# Patient Record
Sex: MALE | Race: WHITE | Employment: FULL TIME | URBAN - METROPOLITAN AREA
[De-identification: names, ages, dates, MRNs, and addresses within clinical notes are randomized per-mention and may not be internally consistent; named-entity substitution may affect disease eponyms.]

---

## 2017-11-14 PROBLEM — I10 ESSENTIAL HYPERTENSION: Status: ACTIVE | Noted: 2017-11-14

## 2017-11-14 PROBLEM — R53.83 FATIGUE: Status: ACTIVE | Noted: 2017-11-14

## 2017-11-14 PROBLEM — Z72.0 TOBACCO ABUSE: Status: ACTIVE | Noted: 2017-11-14

## 2017-11-14 PROBLEM — Z82.49 FAMILY HISTORY OF CHRONIC ISCHEMIC HEART DISEASE: Status: ACTIVE | Noted: 2017-11-14

## 2018-01-05 PROBLEM — D58.2 ELEVATED HEMOGLOBIN (HCC): Status: ACTIVE | Noted: 2018-01-05

## 2018-01-05 PROBLEM — E78.5 DYSLIPIDEMIA: Status: ACTIVE | Noted: 2018-01-05

## 2018-03-14 ENCOUNTER — TELEPHONE (OUTPATIENT)
Dept: FAMILY MEDICINE CLINIC | Age: 68
End: 2018-03-14

## 2018-03-16 ENCOUNTER — HOSPITAL ENCOUNTER (OUTPATIENT)
Age: 68
Discharge: HOME | End: 2018-03-18
Payer: COMMERCIAL

## 2018-03-16 DIAGNOSIS — Z72.0 TOBACCO ABUSE: ICD-10-CM

## 2018-03-16 DIAGNOSIS — E78.5 DYSLIPIDEMIA: ICD-10-CM

## 2018-03-16 DIAGNOSIS — I10 ESSENTIAL HYPERTENSION: ICD-10-CM

## 2018-03-16 DIAGNOSIS — D58.2 ELEVATED HEMOGLOBIN (HCC): ICD-10-CM

## 2018-03-16 LAB
ALBUMIN SERPL-MCNC: 4.5 G/DL (ref 3.5–5.2)
ALP BLD-CCNC: 90 U/L (ref 40–129)
ALT SERPL-CCNC: 29 U/L (ref 0–40)
ANION GAP SERPL CALCULATED.3IONS-SCNC: 15 MMOL/L (ref 7–16)
AST SERPL-CCNC: 21 U/L (ref 0–39)
BASOPHILS ABSOLUTE: 0.05 E9/L (ref 0–0.2)
BASOPHILS RELATIVE PERCENT: 0.4 % (ref 0–2)
BILIRUB SERPL-MCNC: 0.8 MG/DL (ref 0–1.2)
BUN BLDV-MCNC: 13 MG/DL (ref 8–23)
CALCIUM SERPL-MCNC: 9.6 MG/DL (ref 8.6–10.2)
CHLORIDE BLD-SCNC: 99 MMOL/L (ref 98–107)
CHOLESTEROL, TOTAL: 232 MG/DL (ref 0–199)
CO2: 27 MMOL/L (ref 22–29)
CREAT SERPL-MCNC: 0.9 MG/DL (ref 0.7–1.2)
EOSINOPHILS ABSOLUTE: 0.35 E9/L (ref 0.05–0.5)
EOSINOPHILS RELATIVE PERCENT: 3.1 % (ref 0–6)
GFR AFRICAN AMERICAN: >60
GFR NON-AFRICAN AMERICAN: >60 ML/MIN/1.73
GLUCOSE BLD-MCNC: 91 MG/DL (ref 74–109)
HCT VFR BLD CALC: 53.9 % (ref 37–54)
HDLC SERPL-MCNC: 33 MG/DL
HEMOGLOBIN: 17.5 G/DL (ref 12.5–16.5)
IMMATURE GRANULOCYTES #: 0.04 E9/L
IMMATURE GRANULOCYTES %: 0.4 % (ref 0–5)
LDL CHOLESTEROL CALCULATED: 165 MG/DL (ref 0–99)
LYMPHOCYTES ABSOLUTE: 3.47 E9/L (ref 1.5–4)
LYMPHOCYTES RELATIVE PERCENT: 31.1 % (ref 20–42)
MCH RBC QN AUTO: 28 PG (ref 26–35)
MCHC RBC AUTO-ENTMCNC: 32.5 % (ref 32–34.5)
MCV RBC AUTO: 86.1 FL (ref 80–99.9)
MONOCYTES ABSOLUTE: 0.97 E9/L (ref 0.1–0.95)
MONOCYTES RELATIVE PERCENT: 8.7 % (ref 2–12)
NEUTROPHILS ABSOLUTE: 6.29 E9/L (ref 1.8–7.3)
NEUTROPHILS RELATIVE PERCENT: 56.3 % (ref 43–80)
PDW BLD-RTO: 14 FL (ref 11.5–15)
PLATELET # BLD: 282 E9/L (ref 130–450)
PMV BLD AUTO: 11 FL (ref 7–12)
POTASSIUM SERPL-SCNC: 4.4 MMOL/L (ref 3.5–5)
RBC # BLD: 6.26 E12/L (ref 3.8–5.8)
SODIUM BLD-SCNC: 141 MMOL/L (ref 132–146)
TOTAL PROTEIN: 7.8 G/DL (ref 6.4–8.3)
TRIGL SERPL-MCNC: 169 MG/DL (ref 0–149)
VLDLC SERPL CALC-MCNC: 34 MG/DL
WBC # BLD: 11.2 E9/L (ref 4.5–11.5)

## 2018-03-16 PROCEDURE — 85025 COMPLETE CBC W/AUTO DIFF WBC: CPT

## 2018-03-16 PROCEDURE — 36415 COLL VENOUS BLD VENIPUNCTURE: CPT

## 2018-03-16 PROCEDURE — 80061 LIPID PANEL: CPT

## 2018-03-16 PROCEDURE — 80053 COMPREHEN METABOLIC PANEL: CPT

## 2018-03-21 ENCOUNTER — HOSPITAL ENCOUNTER (OUTPATIENT)
Dept: PSYCHIATRY | Age: 68
Setting detail: THERAPIES SERIES
Discharge: HOME | End: 2018-03-21

## 2018-03-28 ENCOUNTER — HOSPITAL ENCOUNTER (OUTPATIENT)
Dept: PSYCHIATRY | Age: 68
Setting detail: THERAPIES SERIES
Discharge: HOME | End: 2018-03-28
Payer: COMMERCIAL

## 2018-03-28 PROCEDURE — 90791 PSYCH DIAGNOSTIC EVALUATION: CPT

## 2018-03-28 ASSESSMENT — SLEEP AND FATIGUE QUESTIONNAIRES
DO YOU HAVE DIFFICULTY SLEEPING: YES
DIFFICULTY STAYING ASLEEP: YES
DIFFICULTY FALLING ASLEEP: YES
SLEEP PATTERN: DIFFICULTY FALLING ASLEEP;RESTLESSNESS;DISTURBED/INTERRUPTED SLEEP
RESTFUL SLEEP: NO
AVERAGE NUMBER OF SLEEP HOURS: 6
DO YOU USE A SLEEP AID: NO

## 2018-03-28 ASSESSMENT — LIFESTYLE VARIABLES: HISTORY_ALCOHOL_USE: YES

## 2018-03-28 ASSESSMENT — PATIENT HEALTH QUESTIONNAIRE - PHQ9: SUM OF ALL RESPONSES TO PHQ QUESTIONS 1-9: 12

## 2018-03-28 NOTE — CARE COORDINATION
SW met with pt to complete psychosocial assessment and CSSR-S. Pt was cooperative during time of assessment. Mood appeared anxious and euthymic at times, affect congruent. Pt was oriented x4. Thought processes were circumstantial and tangential. Pt denies hallucinations and delusions. Denies SI/HI. Pt currently lives at home by himself. Pt reported having good support system through ex-wife and one daughter. Pt is receiving short-term disability from his employer. Pt reported that he is employed with a company as an  but had to take time off due to increase in depression stemming from dental health concerns. Pt reported that he had dentures that were corrected by a dental company and ruined which has been a very big stressor for him causing a depressive episode. Pt stated that he cannot eat and cannot focus on anything else besides his dental issues. Pt reported that he has been experiencing depression and anxiety since approx 2003. 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 Pt reported that he has been sober for the past 18 years but would not consider his past alcohol use to be an issue. Pt indicated that he stopped drinking on his own and became increasingly depressed in 2003. In addition to dental concerns, pt reported worrying about having high blood pressure which may cause him to have a heart attack or pass out. Pt reported that he has been experiencing a depressed mood most of the day nearly every day, lack of interest or pleasure in doing things, experiencing hopelessness and helplessness, sleep disturbances, difficulty concentrating, and feeling fatigued. Pt stated that he has been experiencing depression on and off since 2003. Pt also indicated experiencing anxiety and panic attacks throughout his lifetime.  Pt stated that he experiences excessive worry, difficulty controlling worry, sleep disturbances, difficulty concentrating, and history of

## 2018-03-30 ENCOUNTER — HOSPITAL ENCOUNTER (OUTPATIENT)
Dept: PSYCHIATRY | Age: 68
Setting detail: THERAPIES SERIES
Discharge: HOME | End: 2018-03-30
Payer: COMMERCIAL

## 2018-03-30 VITALS
WEIGHT: 170 LBS | TEMPERATURE: 98.1 F | HEART RATE: 67 BPM | SYSTOLIC BLOOD PRESSURE: 184 MMHG | DIASTOLIC BLOOD PRESSURE: 95 MMHG | RESPIRATION RATE: 16 BRPM | BODY MASS INDEX: 21.82 KG/M2 | HEIGHT: 74 IN

## 2018-03-30 PROCEDURE — 90853 GROUP PSYCHOTHERAPY: CPT

## 2018-03-30 RX ORDER — OLANZAPINE 5 MG/1
5 TABLET ORAL NIGHTLY
COMMUNITY
End: 2018-05-10

## 2018-03-30 RX ORDER — VENLAFAXINE HYDROCHLORIDE 75 MG/1
75 CAPSULE, EXTENDED RELEASE ORAL DAILY
COMMUNITY
End: 2018-06-08 | Stop reason: HOSPADM

## 2018-03-30 NOTE — PROGRESS NOTES
Group Therapy Note    Date: 3/30/2018  Start Time:1125  End BVQR:[id number]  Number of Participants: 9    Type of Group: Recreational    Wellness Binder Information  Module Name:  tv and music trivia  Session Number:  na    Patient's Goal: patient will be able to take turns, share and connect with others thru trivia. Notes:  Patient polite and engaged in trivia, sharing with others. Status After Intervention:  Improved    Participation Level:  Active Listener and Interactive    Participation Quality: Appropriate, Attentive, Sharing and Supportive      Speech:  normal      Thought Process/Content: Logical      Affective Functioning: Congruent      Mood: euthymic      Level of consciousness:  Alert, Oriented x4 and Attentive      Response to Learning: Able to verbalize/acknowledge new learning, Able to retain information and Progressing to goal      Endings: None Reported    Modes of Intervention: Education, Support, Socialization, Exploration and Problem-solving      Discipline Responsible: Psychoeducational Specialist      Signature:  Candelario Caldwell

## 2018-04-05 ENCOUNTER — HOSPITAL ENCOUNTER (OUTPATIENT)
Dept: PSYCHIATRY | Age: 68
Setting detail: THERAPIES SERIES
Discharge: HOME | End: 2018-04-05

## 2018-04-05 ENCOUNTER — OFFICE VISIT (OUTPATIENT)
Dept: FAMILY MEDICINE CLINIC | Age: 68
End: 2018-04-05
Payer: COMMERCIAL

## 2018-04-05 VITALS
HEIGHT: 74 IN | OXYGEN SATURATION: 98 % | SYSTOLIC BLOOD PRESSURE: 160 MMHG | DIASTOLIC BLOOD PRESSURE: 92 MMHG | BODY MASS INDEX: 22.59 KG/M2 | HEART RATE: 65 BPM | WEIGHT: 176 LBS

## 2018-04-05 DIAGNOSIS — Z12.11 SCREENING FOR COLON CANCER: ICD-10-CM

## 2018-04-05 DIAGNOSIS — E78.5 DYSLIPIDEMIA: ICD-10-CM

## 2018-04-05 DIAGNOSIS — I10 ESSENTIAL HYPERTENSION: Primary | ICD-10-CM

## 2018-04-05 PROCEDURE — 4040F PNEUMOC VAC/ADMIN/RCVD: CPT | Performed by: FAMILY MEDICINE

## 2018-04-05 PROCEDURE — G8420 CALC BMI NORM PARAMETERS: HCPCS | Performed by: FAMILY MEDICINE

## 2018-04-05 PROCEDURE — 3017F COLORECTAL CA SCREEN DOC REV: CPT | Performed by: FAMILY MEDICINE

## 2018-04-05 PROCEDURE — 1123F ACP DISCUSS/DSCN MKR DOCD: CPT | Performed by: FAMILY MEDICINE

## 2018-04-05 PROCEDURE — G8427 DOCREV CUR MEDS BY ELIG CLIN: HCPCS | Performed by: FAMILY MEDICINE

## 2018-04-05 PROCEDURE — 99213 OFFICE O/P EST LOW 20 MIN: CPT | Performed by: FAMILY MEDICINE

## 2018-04-05 PROCEDURE — 4004F PT TOBACCO SCREEN RCVD TLK: CPT | Performed by: FAMILY MEDICINE

## 2018-04-05 RX ORDER — ATORVASTATIN CALCIUM 20 MG/1
20 TABLET, FILM COATED ORAL NIGHTLY
Qty: 30 TABLET | Refills: 5 | Status: SHIPPED | OUTPATIENT
Start: 2018-04-05 | End: 2018-04-27 | Stop reason: SDUPTHER

## 2018-04-05 RX ORDER — LISINOPRIL 40 MG/1
40 TABLET ORAL DAILY
Qty: 30 TABLET | Refills: 5 | Status: SHIPPED | OUTPATIENT
Start: 2018-04-05 | End: 2018-10-08 | Stop reason: SDUPTHER

## 2018-04-05 ASSESSMENT — ENCOUNTER SYMPTOMS
SHORTNESS OF BREATH: 1
SORE THROAT: 0
WHEEZING: 1
BLOOD IN STOOL: 0
ABDOMINAL PAIN: 0

## 2018-04-06 ENCOUNTER — HOSPITAL ENCOUNTER (OUTPATIENT)
Dept: PSYCHIATRY | Age: 68
Setting detail: THERAPIES SERIES
Discharge: HOME | End: 2018-04-06
Payer: COMMERCIAL

## 2018-04-06 PROCEDURE — 90853 GROUP PSYCHOTHERAPY: CPT

## 2018-04-09 ENCOUNTER — HOSPITAL ENCOUNTER (OUTPATIENT)
Dept: PSYCHIATRY | Age: 68
Setting detail: THERAPIES SERIES
Discharge: HOME | End: 2018-04-09
Payer: COMMERCIAL

## 2018-04-09 PROBLEM — F39 MOOD DISORDER (HCC): Status: ACTIVE | Noted: 2018-04-09

## 2018-04-09 PROCEDURE — 90853 GROUP PSYCHOTHERAPY: CPT

## 2018-04-13 ENCOUNTER — HOSPITAL ENCOUNTER (OUTPATIENT)
Dept: PSYCHIATRY | Age: 68
Setting detail: THERAPIES SERIES
Discharge: HOME | End: 2018-04-13
Payer: COMMERCIAL

## 2018-04-13 DIAGNOSIS — F39 MOOD DISORDER (HCC): ICD-10-CM

## 2018-04-13 PROCEDURE — 90853 GROUP PSYCHOTHERAPY: CPT

## 2018-04-17 ENCOUNTER — TELEPHONE (OUTPATIENT)
Dept: ADMINISTRATIVE | Age: 68
End: 2018-04-17

## 2018-04-17 ENCOUNTER — HOSPITAL ENCOUNTER (OUTPATIENT)
Dept: PSYCHIATRY | Age: 68
Setting detail: THERAPIES SERIES
Discharge: HOME | End: 2018-04-17
Payer: COMMERCIAL

## 2018-04-17 DIAGNOSIS — F39 MOOD DISORDER (HCC): ICD-10-CM

## 2018-04-17 PROCEDURE — 90853 GROUP PSYCHOTHERAPY: CPT

## 2018-04-19 ENCOUNTER — HOSPITAL ENCOUNTER (OUTPATIENT)
Dept: PSYCHIATRY | Age: 68
Setting detail: THERAPIES SERIES
Discharge: HOME | End: 2018-04-19
Payer: COMMERCIAL

## 2018-04-19 DIAGNOSIS — F39 MOOD DISORDER (HCC): ICD-10-CM

## 2018-04-19 PROCEDURE — 90853 GROUP PSYCHOTHERAPY: CPT

## 2018-04-20 ENCOUNTER — HOSPITAL ENCOUNTER (OUTPATIENT)
Dept: PSYCHIATRY | Age: 68
Setting detail: THERAPIES SERIES
Discharge: HOME | End: 2018-04-20
Payer: COMMERCIAL

## 2018-04-20 DIAGNOSIS — F39 MOOD DISORDER (HCC): ICD-10-CM

## 2018-04-20 PROCEDURE — 90853 GROUP PSYCHOTHERAPY: CPT

## 2018-04-24 ENCOUNTER — HOSPITAL ENCOUNTER (OUTPATIENT)
Dept: PSYCHIATRY | Age: 68
Setting detail: THERAPIES SERIES
Discharge: HOME | End: 2018-04-24
Payer: COMMERCIAL

## 2018-04-24 DIAGNOSIS — F39 MOOD DISORDER (HCC): ICD-10-CM

## 2018-04-24 PROCEDURE — 90853 GROUP PSYCHOTHERAPY: CPT

## 2018-04-26 ENCOUNTER — HOSPITAL ENCOUNTER (OUTPATIENT)
Dept: PSYCHIATRY | Age: 68
Setting detail: THERAPIES SERIES
Discharge: HOME | End: 2018-04-26
Payer: COMMERCIAL

## 2018-04-26 DIAGNOSIS — F39 MOOD DISORDER (HCC): ICD-10-CM

## 2018-04-26 PROCEDURE — 90853 GROUP PSYCHOTHERAPY: CPT

## 2018-04-27 ENCOUNTER — HOSPITAL ENCOUNTER (OUTPATIENT)
Dept: PSYCHIATRY | Age: 68
Setting detail: THERAPIES SERIES
Discharge: HOME | End: 2018-04-27
Payer: COMMERCIAL

## 2018-04-27 DIAGNOSIS — E78.5 DYSLIPIDEMIA: ICD-10-CM

## 2018-04-27 DIAGNOSIS — F39 MOOD DISORDER (HCC): ICD-10-CM

## 2018-04-27 PROCEDURE — 90853 GROUP PSYCHOTHERAPY: CPT

## 2018-04-27 RX ORDER — ATORVASTATIN CALCIUM 20 MG/1
20 TABLET, FILM COATED ORAL NIGHTLY
Qty: 30 TABLET | Refills: 5 | Status: SHIPPED | OUTPATIENT
Start: 2018-04-27 | End: 2019-09-05 | Stop reason: SDUPTHER

## 2018-05-01 ENCOUNTER — HOSPITAL ENCOUNTER (OUTPATIENT)
Dept: PSYCHIATRY | Age: 68
Setting detail: THERAPIES SERIES
Discharge: HOME | End: 2018-05-01
Payer: COMMERCIAL

## 2018-05-01 DIAGNOSIS — F39 MOOD DISORDER (HCC): ICD-10-CM

## 2018-05-01 PROCEDURE — 90853 GROUP PSYCHOTHERAPY: CPT

## 2018-05-03 ENCOUNTER — HOSPITAL ENCOUNTER (OUTPATIENT)
Dept: PSYCHIATRY | Age: 68
Setting detail: THERAPIES SERIES
Discharge: HOME | End: 2018-05-03
Payer: COMMERCIAL

## 2018-05-03 DIAGNOSIS — F39 MOOD DISORDER (HCC): ICD-10-CM

## 2018-05-03 PROCEDURE — 90853 GROUP PSYCHOTHERAPY: CPT

## 2018-05-04 ENCOUNTER — HOSPITAL ENCOUNTER (OUTPATIENT)
Dept: PSYCHIATRY | Age: 68
Setting detail: THERAPIES SERIES
Discharge: HOME | End: 2018-05-04
Payer: COMMERCIAL

## 2018-05-04 DIAGNOSIS — F39 MOOD DISORDER (HCC): ICD-10-CM

## 2018-05-04 PROCEDURE — 90853 GROUP PSYCHOTHERAPY: CPT

## 2018-05-08 ENCOUNTER — HOSPITAL ENCOUNTER (OUTPATIENT)
Dept: PSYCHIATRY | Age: 68
Setting detail: THERAPIES SERIES
Discharge: HOME | End: 2018-05-08
Payer: COMMERCIAL

## 2018-05-08 DIAGNOSIS — F39 MOOD DISORDER (HCC): ICD-10-CM

## 2018-05-08 PROCEDURE — 90853 GROUP PSYCHOTHERAPY: CPT

## 2018-05-10 ENCOUNTER — HOSPITAL ENCOUNTER (OUTPATIENT)
Dept: PSYCHIATRY | Age: 68
Setting detail: THERAPIES SERIES
Discharge: HOME | End: 2018-05-10
Payer: COMMERCIAL

## 2018-05-10 DIAGNOSIS — F39 MOOD DISORDER (HCC): ICD-10-CM

## 2018-05-10 PROCEDURE — 99213 OFFICE O/P EST LOW 20 MIN: CPT | Performed by: PSYCHIATRY & NEUROLOGY

## 2018-05-10 PROCEDURE — 90853 GROUP PSYCHOTHERAPY: CPT

## 2018-05-10 RX ORDER — GABAPENTIN 600 MG/1
600 TABLET ORAL 4 TIMES DAILY
Qty: 120 TABLET | Refills: 0 | Status: SHIPPED | OUTPATIENT
Start: 2018-05-10 | End: 2018-06-08

## 2018-05-10 RX ORDER — OLANZAPINE 5 MG/1
5 TABLET ORAL NIGHTLY
Qty: 30 TABLET | Refills: 0 | Status: SHIPPED | OUTPATIENT
Start: 2018-05-10 | End: 2018-06-08

## 2018-05-11 ENCOUNTER — HOSPITAL ENCOUNTER (OUTPATIENT)
Dept: PSYCHIATRY | Age: 68
Setting detail: THERAPIES SERIES
Discharge: HOME | End: 2018-05-11
Payer: COMMERCIAL

## 2018-05-11 DIAGNOSIS — F39 MOOD DISORDER (HCC): ICD-10-CM

## 2018-05-11 PROCEDURE — 90853 GROUP PSYCHOTHERAPY: CPT

## 2018-05-15 ENCOUNTER — HOSPITAL ENCOUNTER (OUTPATIENT)
Dept: PSYCHIATRY | Age: 68
Setting detail: THERAPIES SERIES
Discharge: HOME | End: 2018-05-15
Payer: COMMERCIAL

## 2018-05-15 VITALS — DIASTOLIC BLOOD PRESSURE: 82 MMHG | SYSTOLIC BLOOD PRESSURE: 145 MMHG | HEART RATE: 65 BPM

## 2018-05-15 DIAGNOSIS — F39 MOOD DISORDER (HCC): ICD-10-CM

## 2018-05-15 PROCEDURE — 90853 GROUP PSYCHOTHERAPY: CPT

## 2018-05-17 ENCOUNTER — HOSPITAL ENCOUNTER (OUTPATIENT)
Dept: PSYCHIATRY | Age: 68
Setting detail: THERAPIES SERIES
Discharge: HOME | End: 2018-05-17
Payer: COMMERCIAL

## 2018-05-17 DIAGNOSIS — F39 MOOD DISORDER (HCC): ICD-10-CM

## 2018-05-17 PROCEDURE — 90853 GROUP PSYCHOTHERAPY: CPT

## 2018-05-18 ENCOUNTER — HOSPITAL ENCOUNTER (OUTPATIENT)
Dept: PSYCHIATRY | Age: 68
Setting detail: THERAPIES SERIES
Discharge: HOME | End: 2018-05-18
Payer: COMMERCIAL

## 2018-05-18 DIAGNOSIS — F39 MOOD DISORDER (HCC): ICD-10-CM

## 2018-05-18 PROCEDURE — 90853 GROUP PSYCHOTHERAPY: CPT

## 2018-05-23 ENCOUNTER — OFFICE VISIT (OUTPATIENT)
Dept: FAMILY MEDICINE CLINIC | Age: 68
End: 2018-05-23
Payer: COMMERCIAL

## 2018-05-23 VITALS
WEIGHT: 177.4 LBS | DIASTOLIC BLOOD PRESSURE: 98 MMHG | OXYGEN SATURATION: 98 % | SYSTOLIC BLOOD PRESSURE: 176 MMHG | HEART RATE: 65 BPM | BODY MASS INDEX: 22.78 KG/M2

## 2018-05-23 DIAGNOSIS — I10 ESSENTIAL HYPERTENSION: Primary | Chronic | ICD-10-CM

## 2018-05-23 DIAGNOSIS — E78.5 DYSLIPIDEMIA: Chronic | ICD-10-CM

## 2018-05-23 DIAGNOSIS — Z12.11 COLON CANCER SCREENING: ICD-10-CM

## 2018-05-23 LAB
CONTROL: NORMAL
HEMOCCULT STL QL: NEGATIVE

## 2018-05-23 PROCEDURE — 82274 ASSAY TEST FOR BLOOD FECAL: CPT | Performed by: FAMILY MEDICINE

## 2018-05-23 PROCEDURE — 3017F COLORECTAL CA SCREEN DOC REV: CPT | Performed by: FAMILY MEDICINE

## 2018-05-23 PROCEDURE — 99213 OFFICE O/P EST LOW 20 MIN: CPT | Performed by: FAMILY MEDICINE

## 2018-05-23 PROCEDURE — 1123F ACP DISCUSS/DSCN MKR DOCD: CPT | Performed by: FAMILY MEDICINE

## 2018-05-23 PROCEDURE — G8420 CALC BMI NORM PARAMETERS: HCPCS | Performed by: FAMILY MEDICINE

## 2018-05-23 PROCEDURE — G8427 DOCREV CUR MEDS BY ELIG CLIN: HCPCS | Performed by: FAMILY MEDICINE

## 2018-05-23 PROCEDURE — 4004F PT TOBACCO SCREEN RCVD TLK: CPT | Performed by: FAMILY MEDICINE

## 2018-05-23 PROCEDURE — 4040F PNEUMOC VAC/ADMIN/RCVD: CPT | Performed by: FAMILY MEDICINE

## 2018-05-23 RX ORDER — HYDROCHLOROTHIAZIDE 12.5 MG/1
12.5 TABLET ORAL DAILY
Qty: 30 TABLET | Refills: 3 | Status: SHIPPED | OUTPATIENT
Start: 2018-05-23 | End: 2018-08-03 | Stop reason: SDUPTHER

## 2018-05-23 ASSESSMENT — ENCOUNTER SYMPTOMS
BLOOD IN STOOL: 0
ABDOMINAL PAIN: 0
COUGH: 0
SHORTNESS OF BREATH: 1

## 2018-05-24 ENCOUNTER — HOSPITAL ENCOUNTER (OUTPATIENT)
Dept: PSYCHIATRY | Age: 68
Setting detail: THERAPIES SERIES
Discharge: HOME | End: 2018-05-24

## 2018-05-24 ENCOUNTER — HOSPITAL ENCOUNTER (OUTPATIENT)
Dept: PSYCHIATRY | Age: 68
Setting detail: THERAPIES SERIES
Discharge: HOME | End: 2018-05-24
Payer: COMMERCIAL

## 2018-05-25 ENCOUNTER — HOSPITAL ENCOUNTER (OUTPATIENT)
Dept: PSYCHIATRY | Age: 68
Setting detail: THERAPIES SERIES
Discharge: HOME | End: 2018-05-25
Payer: COMMERCIAL

## 2018-05-29 ENCOUNTER — HOSPITAL ENCOUNTER (OUTPATIENT)
Dept: PSYCHIATRY | Age: 68
Setting detail: THERAPIES SERIES
Discharge: HOME | End: 2018-05-29
Payer: COMMERCIAL

## 2018-05-29 DIAGNOSIS — F39 MOOD DISORDER (HCC): ICD-10-CM

## 2018-05-29 PROCEDURE — 99213 OFFICE O/P EST LOW 20 MIN: CPT | Performed by: PSYCHIATRY & NEUROLOGY

## 2018-05-29 PROCEDURE — 90853 GROUP PSYCHOTHERAPY: CPT

## 2018-05-31 ENCOUNTER — HOSPITAL ENCOUNTER (OUTPATIENT)
Dept: PSYCHIATRY | Age: 68
Setting detail: THERAPIES SERIES
Discharge: HOME | End: 2018-05-31
Payer: COMMERCIAL

## 2018-05-31 DIAGNOSIS — F39 MOOD DISORDER (HCC): ICD-10-CM

## 2018-05-31 PROCEDURE — 90853 GROUP PSYCHOTHERAPY: CPT

## 2018-06-01 ENCOUNTER — HOSPITAL ENCOUNTER (OUTPATIENT)
Dept: PSYCHIATRY | Age: 68
Setting detail: THERAPIES SERIES
Discharge: HOME | End: 2018-06-01
Payer: COMMERCIAL

## 2018-06-01 DIAGNOSIS — F39 MOOD DISORDER (HCC): ICD-10-CM

## 2018-06-01 PROCEDURE — 90853 GROUP PSYCHOTHERAPY: CPT

## 2018-06-05 ENCOUNTER — HOSPITAL ENCOUNTER (OUTPATIENT)
Dept: PSYCHIATRY | Age: 68
Setting detail: THERAPIES SERIES
Discharge: HOME | End: 2018-06-05
Payer: COMMERCIAL

## 2018-06-05 DIAGNOSIS — F39 MOOD DISORDER (HCC): ICD-10-CM

## 2018-06-05 PROCEDURE — 90853 GROUP PSYCHOTHERAPY: CPT

## 2018-06-07 ENCOUNTER — HOSPITAL ENCOUNTER (OUTPATIENT)
Dept: PSYCHIATRY | Age: 68
Setting detail: THERAPIES SERIES
Discharge: HOME | End: 2018-06-07
Payer: COMMERCIAL

## 2018-06-07 DIAGNOSIS — F39 MOOD DISORDER (HCC): ICD-10-CM

## 2018-06-07 PROCEDURE — 90853 GROUP PSYCHOTHERAPY: CPT

## 2018-06-08 ENCOUNTER — HOSPITAL ENCOUNTER (OUTPATIENT)
Dept: PSYCHIATRY | Age: 68
Setting detail: THERAPIES SERIES
Discharge: HOME | End: 2018-06-08
Payer: COMMERCIAL

## 2018-06-08 DIAGNOSIS — F39 MOOD DISORDER (HCC): ICD-10-CM

## 2018-06-08 PROCEDURE — 99213 OFFICE O/P EST LOW 20 MIN: CPT | Performed by: PSYCHIATRY & NEUROLOGY

## 2018-06-08 PROCEDURE — 90853 GROUP PSYCHOTHERAPY: CPT

## 2018-06-08 RX ORDER — GABAPENTIN 600 MG/1
600 TABLET ORAL 4 TIMES DAILY
Qty: 120 TABLET | Refills: 0 | Status: SHIPPED | OUTPATIENT
Start: 2018-06-08 | End: 2019-09-05 | Stop reason: SDUPTHER

## 2018-06-08 RX ORDER — OLANZAPINE 5 MG/1
5 TABLET ORAL NIGHTLY
Qty: 30 TABLET | Refills: 0 | Status: SHIPPED | OUTPATIENT
Start: 2018-06-08 | End: 2020-09-17 | Stop reason: ALTCHOICE

## 2018-06-28 ENCOUNTER — HOSPITAL ENCOUNTER (OUTPATIENT)
Age: 68
Discharge: HOME | End: 2018-06-30
Payer: COMMERCIAL

## 2018-06-28 DIAGNOSIS — I10 ESSENTIAL HYPERTENSION: Chronic | ICD-10-CM

## 2018-06-28 DIAGNOSIS — E78.5 DYSLIPIDEMIA: Chronic | ICD-10-CM

## 2018-06-28 LAB
ALBUMIN SERPL-MCNC: 4.2 G/DL (ref 3.5–5.2)
ALP BLD-CCNC: 90 U/L (ref 40–129)
ALT SERPL-CCNC: 23 U/L (ref 0–40)
ANION GAP SERPL CALCULATED.3IONS-SCNC: 13 MMOL/L (ref 7–16)
AST SERPL-CCNC: 17 U/L (ref 0–39)
BASOPHILS ABSOLUTE: 0.06 E9/L (ref 0–0.2)
BASOPHILS RELATIVE PERCENT: 0.5 % (ref 0–2)
BILIRUB SERPL-MCNC: 0.3 MG/DL (ref 0–1.2)
BUN BLDV-MCNC: 13 MG/DL (ref 8–23)
CALCIUM SERPL-MCNC: 9.4 MG/DL (ref 8.6–10.2)
CHLORIDE BLD-SCNC: 101 MMOL/L (ref 98–107)
CHOLESTEROL, TOTAL: 189 MG/DL (ref 0–199)
CO2: 27 MMOL/L (ref 22–29)
CREAT SERPL-MCNC: 0.9 MG/DL (ref 0.7–1.2)
EOSINOPHILS ABSOLUTE: 0.35 E9/L (ref 0.05–0.5)
EOSINOPHILS RELATIVE PERCENT: 3.1 % (ref 0–6)
GFR AFRICAN AMERICAN: >60
GFR NON-AFRICAN AMERICAN: >60 ML/MIN/1.73
GLUCOSE BLD-MCNC: 90 MG/DL (ref 74–109)
HCT VFR BLD CALC: 52.2 % (ref 37–54)
HDLC SERPL-MCNC: 27 MG/DL
HEMOGLOBIN: 16.6 G/DL (ref 12.5–16.5)
IMMATURE GRANULOCYTES #: 0.04 E9/L
IMMATURE GRANULOCYTES %: 0.4 % (ref 0–5)
LDL CHOLESTEROL CALCULATED: 112 MG/DL (ref 0–99)
LYMPHOCYTES ABSOLUTE: 3.59 E9/L (ref 1.5–4)
LYMPHOCYTES RELATIVE PERCENT: 31.8 % (ref 20–42)
MCH RBC QN AUTO: 27.9 PG (ref 26–35)
MCHC RBC AUTO-ENTMCNC: 31.8 % (ref 32–34.5)
MCV RBC AUTO: 87.6 FL (ref 80–99.9)
MONOCYTES ABSOLUTE: 1.05 E9/L (ref 0.1–0.95)
MONOCYTES RELATIVE PERCENT: 9.3 % (ref 2–12)
NEUTROPHILS ABSOLUTE: 6.19 E9/L (ref 1.8–7.3)
NEUTROPHILS RELATIVE PERCENT: 54.9 % (ref 43–80)
PDW BLD-RTO: 13.7 FL (ref 11.5–15)
PLATELET # BLD: 296 E9/L (ref 130–450)
PMV BLD AUTO: 11.1 FL (ref 7–12)
POTASSIUM SERPL-SCNC: 3.9 MMOL/L (ref 3.5–5)
RBC # BLD: 5.96 E12/L (ref 3.8–5.8)
SODIUM BLD-SCNC: 141 MMOL/L (ref 132–146)
TOTAL PROTEIN: 7 G/DL (ref 6.4–8.3)
TRIGL SERPL-MCNC: 248 MG/DL (ref 0–149)
VLDLC SERPL CALC-MCNC: 50 MG/DL
WBC # BLD: 11.3 E9/L (ref 4.5–11.5)

## 2018-06-28 PROCEDURE — 80061 LIPID PANEL: CPT

## 2018-06-28 PROCEDURE — 80053 COMPREHEN METABOLIC PANEL: CPT

## 2018-06-28 PROCEDURE — 85025 COMPLETE CBC W/AUTO DIFF WBC: CPT

## 2018-07-03 ENCOUNTER — OFFICE VISIT (OUTPATIENT)
Dept: FAMILY MEDICINE CLINIC | Age: 68
End: 2018-07-03
Payer: COMMERCIAL

## 2018-07-03 VITALS
WEIGHT: 177 LBS | BODY MASS INDEX: 22.73 KG/M2 | DIASTOLIC BLOOD PRESSURE: 84 MMHG | OXYGEN SATURATION: 98 % | HEART RATE: 70 BPM | SYSTOLIC BLOOD PRESSURE: 144 MMHG

## 2018-07-03 DIAGNOSIS — R06.2 WHEEZING: ICD-10-CM

## 2018-07-03 DIAGNOSIS — E78.5 DYSLIPIDEMIA: Chronic | ICD-10-CM

## 2018-07-03 DIAGNOSIS — Z72.0 TOBACCO ABUSE: ICD-10-CM

## 2018-07-03 DIAGNOSIS — I10 ESSENTIAL HYPERTENSION: Primary | Chronic | ICD-10-CM

## 2018-07-03 PROCEDURE — G8427 DOCREV CUR MEDS BY ELIG CLIN: HCPCS | Performed by: FAMILY MEDICINE

## 2018-07-03 PROCEDURE — 4004F PT TOBACCO SCREEN RCVD TLK: CPT | Performed by: FAMILY MEDICINE

## 2018-07-03 PROCEDURE — 3017F COLORECTAL CA SCREEN DOC REV: CPT | Performed by: FAMILY MEDICINE

## 2018-07-03 PROCEDURE — 4040F PNEUMOC VAC/ADMIN/RCVD: CPT | Performed by: FAMILY MEDICINE

## 2018-07-03 PROCEDURE — 99214 OFFICE O/P EST MOD 30 MIN: CPT | Performed by: FAMILY MEDICINE

## 2018-07-03 PROCEDURE — G8420 CALC BMI NORM PARAMETERS: HCPCS | Performed by: FAMILY MEDICINE

## 2018-07-03 PROCEDURE — 1123F ACP DISCUSS/DSCN MKR DOCD: CPT | Performed by: FAMILY MEDICINE

## 2018-07-03 RX ORDER — ALBUTEROL SULFATE 90 UG/1
2 AEROSOL, METERED RESPIRATORY (INHALATION) EVERY 6 HOURS PRN
Qty: 1 INHALER | Refills: 3 | Status: SHIPPED | OUTPATIENT
Start: 2018-07-03 | End: 2019-03-11 | Stop reason: SDUPTHER

## 2018-07-03 RX ORDER — PNV NO.95/FERROUS FUM/FOLIC AC 28MG-0.8MG
2000 TABLET ORAL DAILY
Qty: 30 CAPSULE | Refills: 0
Start: 2018-07-03 | End: 2020-10-15 | Stop reason: ALTCHOICE

## 2018-07-03 ASSESSMENT — ENCOUNTER SYMPTOMS
COUGH: 0
BLOOD IN STOOL: 0
SHORTNESS OF BREATH: 1
ABDOMINAL PAIN: 0

## 2018-07-13 ENCOUNTER — OFFICE VISIT (OUTPATIENT)
Dept: FAMILY MEDICINE CLINIC | Age: 68
End: 2018-07-13
Payer: COMMERCIAL

## 2018-07-13 VITALS
DIASTOLIC BLOOD PRESSURE: 86 MMHG | BODY MASS INDEX: 22.59 KG/M2 | WEIGHT: 176 LBS | SYSTOLIC BLOOD PRESSURE: 140 MMHG | HEART RATE: 71 BPM | OXYGEN SATURATION: 98 % | HEIGHT: 74 IN

## 2018-07-13 DIAGNOSIS — J43.8 OTHER EMPHYSEMA (HCC): ICD-10-CM

## 2018-07-13 PROCEDURE — G8926 SPIRO NO PERF OR DOC: HCPCS | Performed by: FAMILY MEDICINE

## 2018-07-13 PROCEDURE — 1101F PT FALLS ASSESS-DOCD LE1/YR: CPT | Performed by: FAMILY MEDICINE

## 2018-07-13 PROCEDURE — 3023F SPIROM DOC REV: CPT | Performed by: FAMILY MEDICINE

## 2018-07-13 PROCEDURE — 99213 OFFICE O/P EST LOW 20 MIN: CPT | Performed by: FAMILY MEDICINE

## 2018-07-13 PROCEDURE — 1123F ACP DISCUSS/DSCN MKR DOCD: CPT | Performed by: FAMILY MEDICINE

## 2018-07-13 PROCEDURE — G8427 DOCREV CUR MEDS BY ELIG CLIN: HCPCS | Performed by: FAMILY MEDICINE

## 2018-07-13 PROCEDURE — G8420 CALC BMI NORM PARAMETERS: HCPCS | Performed by: FAMILY MEDICINE

## 2018-07-13 PROCEDURE — 4004F PT TOBACCO SCREEN RCVD TLK: CPT | Performed by: FAMILY MEDICINE

## 2018-07-13 PROCEDURE — 4040F PNEUMOC VAC/ADMIN/RCVD: CPT | Performed by: FAMILY MEDICINE

## 2018-07-13 PROCEDURE — 3017F COLORECTAL CA SCREEN DOC REV: CPT | Performed by: FAMILY MEDICINE

## 2018-07-13 ASSESSMENT — ENCOUNTER SYMPTOMS
COUGH: 1
SHORTNESS OF BREATH: 1
RHINORRHEA: 0
WHEEZING: 1

## 2018-07-13 NOTE — PROGRESS NOTES
Subjective:      Patient ID: Abel Connelly is a 80 y.o. male. Chief Complaint   Patient presents with    Breathing Problem     pt would like to discuss Pulmonology referral, pt states he has SOB with regular ADLs     HPI    COPD:  Current treatment includes short-acting beta agonist inhaler. Residual symptoms: chronic dyspnea, non-productive cough, wheezing and decreased exercise tolerance. He denies chest pain, fever, productive cough. He requires his rescue inhaler 4 time(s) per day. Patient has never had PFTs or seen a pulmonologist in the past. Patient is a current smoker, 7-10 cigarettes per day on average. Review of Systems   Constitutional: Positive for fatigue. Negative for chills, fever and unexpected weight change. HENT: Negative for congestion and rhinorrhea. Respiratory: Positive for cough, shortness of breath and wheezing. Cardiovascular: Negative for chest pain and leg swelling. Psychiatric/Behavioral: Positive for dysphoric mood. The patient is nervous/anxious. Objective:   Physical Exam   Constitutional: He is oriented to person, place, and time. No distress. HENT:   Head: Normocephalic and atraumatic. Right Ear: External ear normal.   Left Ear: External ear normal.   Eyes: Conjunctivae are normal.   Neck: Neck supple. Cardiovascular: Normal rate, regular rhythm, normal heart sounds and intact distal pulses. Pulmonary/Chest: Effort normal. No respiratory distress. He has decreased breath sounds. He has no wheezes. He has no rhonchi. He has no rales. Musculoskeletal: He exhibits no edema. Neurological: He is alert and oriented to person, place, and time. No cranial nerve deficit. Skin: He is not diaphoretic.    Psychiatric: His speech is normal and behavior is normal. Thought content normal. Cognition and memory are normal.   Flat affect     BP (!) 140/86 (Site: Left Arm, Position: Sitting, Cuff Size: Medium Adult)   Pulse 71   Ht 6' 2.02\" (1.88 m)   Wt 176 lb (79.8 kg)   SpO2 98%   BMI 22.59 kg/m²     Assessment / Plan:      1. Other emphysema ([address]  Obtain PFT, begin trelegy. Pending results and clinical course will consider referral to pulmonology next visit. - Fluticasone-Umeclidin-Vilant 100-62.5-25 MCG/INH AEPB; Inhale 1 actuation into the lungs daily  Dispense: 1 each; Refill: 2  - FULL PFT STUDY WITH PRE AND POST; Future    Return in about 3 weeks (around 8/3/2018) for follow-up of COPD, PFT.     Call or go to ED immediately if symptoms worsen or persist.    Educational materials and/or home exercises printed for patient's review and were included in patient instructions on his/her After Visit Summary and given to patient at the end of visit.       Counseled regarding above diagnosis, including possible risks and complications, especially if left uncontrolled.     Counseled regarding the possible side effects, risks, benefits and alternatives to treatment; patient and/or guardian verbalizes understanding, agrees, feels comfortable with and wishes to proceed with above treatment plan.     Advised patient to call with any new medication issues, and read all Rx info from pharmacy to assure aware of all possible risks and side effects of medication before taking.     Dena Dubon DO  07/13/18  12:24 PM

## 2018-07-16 ENCOUNTER — TELEPHONE (OUTPATIENT)
Dept: FAMILY MEDICINE CLINIC | Age: 68
End: 2018-07-16

## 2018-07-16 DIAGNOSIS — J43.8 OTHER EMPHYSEMA (HCC): Primary | ICD-10-CM

## 2018-07-16 RX ORDER — FLUTICASONE FUROATE AND VILANTEROL 100; 25 UG/1; UG/1
1 POWDER RESPIRATORY (INHALATION) DAILY
Qty: 1 EACH | Refills: 3 | Status: SHIPPED | OUTPATIENT
Start: 2018-07-16 | End: 2018-08-03

## 2018-07-30 ENCOUNTER — HOSPITAL ENCOUNTER (OUTPATIENT)
Dept: PULMONOLOGY | Age: 68
Discharge: HOME | End: 2018-07-30
Payer: COMMERCIAL

## 2018-07-30 DIAGNOSIS — J43.8 OTHER EMPHYSEMA (HCC): ICD-10-CM

## 2018-07-30 PROCEDURE — 94726 PLETHYSMOGRAPHY LUNG VOLUMES: CPT

## 2018-07-30 PROCEDURE — 94729 DIFFUSING CAPACITY: CPT

## 2018-07-30 PROCEDURE — 94060 EVALUATION OF WHEEZING: CPT

## 2018-07-31 NOTE — PROCEDURES
[address]                                PULMONARY FUNCTION    PATIENT NAME: Miriam Loya                      :        1950  MED REC NO:   [id number]                            ROOM:  ACCOUNT NO:   [de-identified]                           ADMIT DATE: 2018  PROVIDER:     Osmin Ferrer DO    DATE OF PROCEDURE:  2018    REQUESTING PROVIDER:  This is a pulmonary function interpretation requested  by Dr. Ranjith Link with regard to the patient. DEMOGRAPHICS:  He is a 72-year-old and he is a  male. His height  is 6 feet 1 inch (73 inches) with a weight of 175 pounds, body mass index  of 23 is ideal.    REASON FOR THE PULMONARY FUNCTION STUDY:  To evaluate his shortness of  breath with apparently a history of emphysema. HAS HE EVER HAD A PULMONARY FUNCTION:  No.  So that diagnosis of emphysema  must be one based on either clinical evaluation or X-ray. MEDICATION ALLERGY:  He has a drug hypersensitivity to PENICILLIN. Does  not describe the allergic reaction. CURRENT MEDICATIONS:  He is on Neurontin, lisinopril, and olanzapine. WAS HE IN A CHRONIC STABLE STATE WHEN PERFORMING THE STUDY:  Yes. He has  actually had no recent community respiratory infections that will influence  the interpretation of this study. PULMONARY SYMPTOM:  1. COUGH:  He denies cough. He stated he did have a productive cough in  2017, was prescribed an inhaler by his primary care physician and no  longer experiences that cough. 2.  WHEEZING:  He states he has a lot of wheezing, when lying down. 3.  DYSPNEA:  He says he has noticed shortness of breath for the last year  and its both with activity and lying down, at rest.    CONSTITUTIONAL SYMPTOMS:  He had some weight loss here recently, but the  amount he does not define. SMOKING HISTORY:  We have a patient that has smoking for 40 years. He  still a current smoker, smoking about 10 cigarettes a day, so half-a-pack a  day. ATOPIC HISTORY:  Denies atopy. PET EXPOSURE:  He has none. ENVIRONMENTAL FACTORS:  He has apparently no influence with his symptoms of  shortness of breath, when it is hot, cold, or humid. 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 As far as his symptoms are concerned with his shortness of breath, he does  have a history of hypertension, on medication with lisinopril and Ace  inhibitor. SLEEP HISTORY:  He sleeps on 2 pillows. FAMILY HISTORY:  He has heart disease on his father's side. No apparent  lung disease is alluded too. TB HISTORY:  He denies any tuberculosis exposure. PULMONARY FUNCTION INTERPRETATION:  1. SPIROMETRY (FORCED VITAL CAPACITY MANEUVER): His vital capacity  predicted for his age and his height is 4.8 L. He did 3.8 L, 80%. There  was improvement after bronchodilator up to 4.8 L, 92% that is a 15%  improvement. In one second of the 4.8 L predicted, he should have 3.6 L  out in 1 second. He had 2 L out, 54% up to 2.6 L, up to 72% of predicted,  32% change pre and postbronchodilator. So, remarkable reversible  component. With his FEV1 to FVC ratio is predicted at 76%,  postbronchodilator it is 59%. So, there is a flow impairment that is not  totally reversible and the evaluation with the smoking history is very  suggestive based on the GOLD guidelines definition of a patient that has  poor reversibility here today after his postbronchodilator trial.  Even  though, there is some partial reversibility, there is not _____  reversibility here.   This would be suggestive of COPD and with his history,  the phenotype that

## 2018-08-03 ENCOUNTER — OFFICE VISIT (OUTPATIENT)
Dept: FAMILY MEDICINE CLINIC | Age: 68
End: 2018-08-03
Payer: COMMERCIAL

## 2018-08-03 VITALS
WEIGHT: 175 LBS | HEART RATE: 64 BPM | SYSTOLIC BLOOD PRESSURE: 178 MMHG | HEIGHT: 74 IN | OXYGEN SATURATION: 99 % | DIASTOLIC BLOOD PRESSURE: 98 MMHG | BODY MASS INDEX: 22.46 KG/M2

## 2018-08-03 DIAGNOSIS — J44.9 STAGE 2 MODERATE COPD BY GOLD CLASSIFICATION (HCC): Primary | Chronic | ICD-10-CM

## 2018-08-03 DIAGNOSIS — I10 ESSENTIAL HYPERTENSION: Chronic | ICD-10-CM

## 2018-08-03 DIAGNOSIS — Z72.0 TOBACCO ABUSE: ICD-10-CM

## 2018-08-03 PROCEDURE — 1123F ACP DISCUSS/DSCN MKR DOCD: CPT | Performed by: FAMILY MEDICINE

## 2018-08-03 PROCEDURE — G8420 CALC BMI NORM PARAMETERS: HCPCS | Performed by: FAMILY MEDICINE

## 2018-08-03 PROCEDURE — G8427 DOCREV CUR MEDS BY ELIG CLIN: HCPCS | Performed by: FAMILY MEDICINE

## 2018-08-03 PROCEDURE — 4040F PNEUMOC VAC/ADMIN/RCVD: CPT | Performed by: FAMILY MEDICINE

## 2018-08-03 PROCEDURE — 99213 OFFICE O/P EST LOW 20 MIN: CPT | Performed by: FAMILY MEDICINE

## 2018-08-03 PROCEDURE — 3023F SPIROM DOC REV: CPT | Performed by: FAMILY MEDICINE

## 2018-08-03 PROCEDURE — 3017F COLORECTAL CA SCREEN DOC REV: CPT | Performed by: FAMILY MEDICINE

## 2018-08-03 PROCEDURE — 1101F PT FALLS ASSESS-DOCD LE1/YR: CPT | Performed by: FAMILY MEDICINE

## 2018-08-03 PROCEDURE — G8926 SPIRO NO PERF OR DOC: HCPCS | Performed by: FAMILY MEDICINE

## 2018-08-03 PROCEDURE — 4004F PT TOBACCO SCREEN RCVD TLK: CPT | Performed by: FAMILY MEDICINE

## 2018-08-03 RX ORDER — BUDESONIDE AND FORMOTEROL FUMARATE DIHYDRATE 160; 4.5 UG/1; UG/1
2 AEROSOL RESPIRATORY (INHALATION) 2 TIMES DAILY
Qty: 1 INHALER | Refills: 3 | Status: SHIPPED | OUTPATIENT
Start: 2018-08-03 | End: 2019-09-05 | Stop reason: SDUPTHER

## 2018-08-03 RX ORDER — HYDROCHLOROTHIAZIDE 25 MG/1
25 TABLET ORAL DAILY
Qty: 30 TABLET | Refills: 3 | Status: SHIPPED | OUTPATIENT
Start: 2018-08-03 | End: 2019-09-05 | Stop reason: SDUPTHER

## 2018-08-03 ASSESSMENT — ENCOUNTER SYMPTOMS
SHORTNESS OF BREATH: 1
RHINORRHEA: 0
COUGH: 1
WHEEZING: 1

## 2018-08-03 NOTE — PROGRESS NOTES
Subjective:      Patient ID: Sasha Mendoza is a 80 y.o. male. Chief Complaint   Patient presents with    Follow Up After Procedure     pt had PFT done 8/1/18     HPI    COPD:  Current treatment includes short-acting beta agonist inhaler. Recent changes in medication: Trelegy was prescribed last visit, it was then changed to breo and spiriva, states these were all cost prohibitive and therefore treatment is unchanged. Residual symptoms: chronic dyspnea, non-productive cough, wheezing and decreased exercise tolerance. He denies chest pain, fever, productive cough. He requires his rescue inhaler 3-4 time(s) per day. Patient has never seen a pulmonologist in the past. PFT was obtained since last visit with result as below. Patient is a current smoker, smokes 1/2ppd. PFT 8/1/18:   CONCLUSION:  So, my concluding remarks on the patient, Dr. Jose Denise, is  that the patient does demonstrate evidence with his extensive smoking  history that is still being currently entertained is that he does have  suggestion here of a pulmonary complication defined by the GOLD guidelines,  right now is consistent with COPD and that is GOLD-II or moderate  impairment with the partial reversible component, which is concerning as  these people tend to progress rather rapidly. HTN: Current treatment: lisinopril 40 mg daily, hydrochlorothiazide 12.5 mg daily. Recent changes in medication: hydrochlorothiazide was started 5/22/18. Patient reports home BP is not checked but reports blood pressure at his psychiatrist's office 7/18/18 was 182/102. The patient is not known to have history of CAD. Last three blood pressure readings below. BP Readings from Last 3 Encounters:   08/03/18 (!) 178/98   07/13/18 (!) 140/86   07/03/18 (!) 144/84      Disability: Patient reports he has filed for disability but that it was rejected.  Cites multiple reasons for this, explaining that he does not feel he is physically able to work due primarily d/t fatigue but also cites multiple other subjective reasons including SOB, dizziness, worry, poor memory. Review of Systems   Constitutional: Positive for fatigue. Negative for chills, fever and unexpected weight change. HENT: Negative for congestion and rhinorrhea. Respiratory: Positive for cough, shortness of breath and wheezing. Cardiovascular: Negative for chest pain and leg swelling. Neurological: Positive for dizziness. Psychiatric/Behavioral: Positive for dysphoric mood. The patient is nervous/anxious. Objective:   Physical Exam   Constitutional: He is oriented to person, place, and time. No distress. HENT:   Head: Normocephalic and atraumatic. Right Ear: External ear normal.   Left Ear: External ear normal.   Eyes: Conjunctivae are normal.   Neck: Neck supple. Carotid bruit is not present. Cardiovascular: Normal rate, regular rhythm, normal heart sounds and intact distal pulses. Pulmonary/Chest: Effort normal. No respiratory distress. He has decreased breath sounds. He has no wheezes. He has no rhonchi. He has no rales. Musculoskeletal: He exhibits no edema. Neurological: He is alert and oriented to person, place, and time. No cranial nerve deficit. Skin: He is not diaphoretic. Psychiatric: His behavior is normal. Thought content normal. His mood appears anxious. His speech is tangential. His speech is not slurred. Cognition and memory are normal. He exhibits a depressed mood. Flat affect. Perseverates     BP (!) 178/98   Pulse 64   Ht 6' 2.02\" (1.88 m)   Wt 175 lb (79.4 kg)   SpO2 99%   BMI 22.46 kg/m²     Assessment / Plan:      1. Stage 2 moderate COPD by GOLD classification ([facility])  Begin symbicort, spiriva. Continue albuterol PRN. Advised patient to call if Symbicort is also cost prohibitive. - budesonide-formoterol (SYMBICORT) 160-4.5 MCG/ACT AERO; Inhale 2 puffs into the lungs 2 times daily  Dispense: 1 Inhaler; Refill: 3    2.  Essential hypertension  Poorly

## 2018-08-17 ENCOUNTER — OFFICE VISIT (OUTPATIENT)
Dept: FAMILY MEDICINE CLINIC | Age: 68
End: 2018-08-17
Payer: COMMERCIAL

## 2018-08-17 VITALS
WEIGHT: 174 LBS | OXYGEN SATURATION: 98 % | SYSTOLIC BLOOD PRESSURE: 164 MMHG | HEART RATE: 70 BPM | DIASTOLIC BLOOD PRESSURE: 82 MMHG | BODY MASS INDEX: 22.33 KG/M2 | HEIGHT: 74 IN

## 2018-08-17 DIAGNOSIS — J44.9 STAGE 2 MODERATE COPD BY GOLD CLASSIFICATION (HCC): Primary | Chronic | ICD-10-CM

## 2018-08-17 DIAGNOSIS — I10 ESSENTIAL HYPERTENSION: Chronic | ICD-10-CM

## 2018-08-17 PROCEDURE — 4004F PT TOBACCO SCREEN RCVD TLK: CPT | Performed by: FAMILY MEDICINE

## 2018-08-17 PROCEDURE — 1123F ACP DISCUSS/DSCN MKR DOCD: CPT | Performed by: FAMILY MEDICINE

## 2018-08-17 PROCEDURE — G8427 DOCREV CUR MEDS BY ELIG CLIN: HCPCS | Performed by: FAMILY MEDICINE

## 2018-08-17 PROCEDURE — G8926 SPIRO NO PERF OR DOC: HCPCS | Performed by: FAMILY MEDICINE

## 2018-08-17 PROCEDURE — 4040F PNEUMOC VAC/ADMIN/RCVD: CPT | Performed by: FAMILY MEDICINE

## 2018-08-17 PROCEDURE — 1101F PT FALLS ASSESS-DOCD LE1/YR: CPT | Performed by: FAMILY MEDICINE

## 2018-08-17 PROCEDURE — 3017F COLORECTAL CA SCREEN DOC REV: CPT | Performed by: FAMILY MEDICINE

## 2018-08-17 PROCEDURE — 99213 OFFICE O/P EST LOW 20 MIN: CPT | Performed by: FAMILY MEDICINE

## 2018-08-17 PROCEDURE — 3023F SPIROM DOC REV: CPT | Performed by: FAMILY MEDICINE

## 2018-08-17 PROCEDURE — G8420 CALC BMI NORM PARAMETERS: HCPCS | Performed by: FAMILY MEDICINE

## 2018-08-17 RX ORDER — AMLODIPINE BESYLATE 5 MG/1
5 TABLET ORAL DAILY
Qty: 30 TABLET | Refills: 3 | Status: SHIPPED | OUTPATIENT
Start: 2018-08-17 | End: 2018-08-31 | Stop reason: SDUPTHER

## 2018-08-17 ASSESSMENT — ENCOUNTER SYMPTOMS
WHEEZING: 1
RHINORRHEA: 0
COUGH: 1
SHORTNESS OF BREATH: 1

## 2018-08-17 NOTE — PROGRESS NOTES
Subjective:      Patient ID: Kenneth Bateman is a 80 y.o. male. Chief Complaint   Patient presents with    Hypertension     pt here for f/u on HTN and COPD     HPI    COPD: Current treatment includes Symbicort and short-acting beta agonist inhaler. Recent changes in medication: Symbicort was started last visit. Residual symptoms: chronic dyspnea, non-productive cough, wheezing and decreased exercise tolerance. He denies chest pain, fever, productive cough. He requires his rescue inhaler approximately once per day, decreased from 3-4 time(s) per day reported last visit. Patient has never seen a pulmonologist in the past. PFT result as below. Patient is a current smoker, smokes 1/2ppd. PFT 8/1/18:   CONCLUSION:  So, my concluding remarks on the patient, Dr. Linda Ramos, is  that the patient does demonstrate evidence with his extensive smoking  history that is still being currently entertained is that he does have  suggestion here of a pulmonary complication defined by the GOLD guidelines,  right now is consistent with COPD and that is GOLD-II or moderate  impairment with the partial reversible component, which is concerning as  these people tend to progress rather rapidly. HTN: Current treatment: lisinopril 40 mg daily, hydrochlorothiazide 25 mg daily. Recent changes in medication: hydrochlorothiazide was increased from 12.5 mg daily last visit. Patient reports home BP is not checked but reports blood pressure at his psychiatrist's office yesterday was 180/79. The patient is not known to have history of CAD. Last three blood pressure readings below. BP Readings from Last 3 Encounters:   08/17/18 (!) 164/82   08/03/18 (!) 178/98   07/13/18 (!) 140/86       Review of Systems   Constitutional: Positive for fatigue. Negative for chills, fever and unexpected weight change. HENT: Negative for congestion and rhinorrhea. Respiratory: Positive for cough, shortness of breath and wheezing.     Cardiovascular: symptoms worsen or persist.    Educational materials and/or home exercises printed for patient's review and were included in patient instructions on his/her After Visit Summary and given to patient at the end of visit.       Counseled regarding above diagnosis, including possible risks and complications, especially if left uncontrolled.     Counseled regarding the possible side effects, risks, benefits and alternatives to treatment; patient and/or guardian verbalizes understanding, agrees, feels comfortable with and wishes to proceed with above treatment plan.     Advised patient to call with any new medication issues, and read all Rx info from pharmacy to assure aware of all possible risks and side effects of medication before taking.     Aparna Verduzco DO  08/19/18  5:57 PM

## 2018-08-31 ENCOUNTER — OFFICE VISIT (OUTPATIENT)
Dept: FAMILY MEDICINE CLINIC | Age: 68
End: 2018-08-31
Payer: COMMERCIAL

## 2018-08-31 VITALS
DIASTOLIC BLOOD PRESSURE: 82 MMHG | WEIGHT: 172 LBS | OXYGEN SATURATION: 97 % | BODY MASS INDEX: 22.07 KG/M2 | SYSTOLIC BLOOD PRESSURE: 152 MMHG | HEART RATE: 81 BPM | HEIGHT: 74 IN

## 2018-08-31 DIAGNOSIS — E78.5 DYSLIPIDEMIA: Chronic | ICD-10-CM

## 2018-08-31 DIAGNOSIS — J44.9 STAGE 2 MODERATE COPD BY GOLD CLASSIFICATION (HCC): Chronic | ICD-10-CM

## 2018-08-31 DIAGNOSIS — I10 ESSENTIAL HYPERTENSION: Primary | Chronic | ICD-10-CM

## 2018-08-31 PROCEDURE — 3017F COLORECTAL CA SCREEN DOC REV: CPT | Performed by: FAMILY MEDICINE

## 2018-08-31 PROCEDURE — 3023F SPIROM DOC REV: CPT | Performed by: FAMILY MEDICINE

## 2018-08-31 PROCEDURE — 4004F PT TOBACCO SCREEN RCVD TLK: CPT | Performed by: FAMILY MEDICINE

## 2018-08-31 PROCEDURE — G8427 DOCREV CUR MEDS BY ELIG CLIN: HCPCS | Performed by: FAMILY MEDICINE

## 2018-08-31 PROCEDURE — G8420 CALC BMI NORM PARAMETERS: HCPCS | Performed by: FAMILY MEDICINE

## 2018-08-31 PROCEDURE — 1123F ACP DISCUSS/DSCN MKR DOCD: CPT | Performed by: FAMILY MEDICINE

## 2018-08-31 PROCEDURE — 4040F PNEUMOC VAC/ADMIN/RCVD: CPT | Performed by: FAMILY MEDICINE

## 2018-08-31 PROCEDURE — 99213 OFFICE O/P EST LOW 20 MIN: CPT | Performed by: FAMILY MEDICINE

## 2018-08-31 PROCEDURE — G8926 SPIRO NO PERF OR DOC: HCPCS | Performed by: FAMILY MEDICINE

## 2018-08-31 PROCEDURE — 1101F PT FALLS ASSESS-DOCD LE1/YR: CPT | Performed by: FAMILY MEDICINE

## 2018-08-31 RX ORDER — AMLODIPINE BESYLATE 10 MG/1
10 TABLET ORAL DAILY
Qty: 30 TABLET | Refills: 3 | Status: SHIPPED | OUTPATIENT
Start: 2018-08-31 | End: 2019-09-05 | Stop reason: SDUPTHER

## 2018-08-31 ASSESSMENT — ENCOUNTER SYMPTOMS
COUGH: 1
SHORTNESS OF BREATH: 1
WHEEZING: 1

## 2018-08-31 NOTE — PROGRESS NOTES
Subjective:      Patient ID: Marlon Jensen is a 80 y.o. male. Chief Complaint   Patient presents with    2 Week Follow-Up     here for BP check     HPI    COPD: Current treatment includes Symbicort, Spiriva, albuterol. Recent changes in medication: Spiriva was prescribed last visit and he began using it a few days ago. Reports improvement in symptoms and use of rescue inhaler has decreased from 3-4 times per day to 1-2 times per day. PFT result as below. Patient is a current smoker, smokes 1/2ppd. PFT 8/1/18:   CONCLUSION:  So, my concluding remarks on the patient, Dr. Anne Marie Hendrix, is  that the patient does demonstrate evidence with his extensive smoking  history that is still being currently entertained is that he does have  suggestion here of a pulmonary complication defined by the GOLD guidelines,  right now is consistent with COPD and that is GOLD-II or moderate  impairment with the partial reversible component, which is concerning as  these people tend to progress rather rapidly. HTN: Current treatment: lisinopril 40 mg daily, hydrochlorothiazide 25 mg daily, amlodipine 5 mg daily. Recent changes in medication: amlodipine 5 mg daily was started last visit. Reports blood pressure has not been checked since last visit. The patient is not known to have history of CAD. Last three blood pressure readings below. BP Readings from Last 3 Encounters:   08/31/18 (!) 152/82   08/17/18 (!) 164/82   08/03/18 (!) 178/98       Review of Systems   Constitutional: Positive for fatigue. Negative for chills, fever and unexpected weight change. Respiratory: Positive for cough, shortness of breath and wheezing. Cardiovascular: Negative for chest pain and leg swelling. Neurological: Negative for weakness and numbness. Psychiatric/Behavioral: Positive for dysphoric mood. The patient is nervous/anxious. Objective:   Physical Exam   Constitutional: He is oriented to person, place, and time. No distress.    HENT:

## 2018-10-08 DIAGNOSIS — I10 ESSENTIAL HYPERTENSION: ICD-10-CM

## 2018-10-09 RX ORDER — LISINOPRIL 40 MG/1
TABLET ORAL
Qty: 30 TABLET | Refills: 4 | Status: SHIPPED | OUTPATIENT
Start: 2018-10-09 | End: 2019-09-05 | Stop reason: SDUPTHER

## 2018-10-22 ENCOUNTER — OFFICE VISIT (OUTPATIENT)
Dept: FAMILY MEDICINE CLINIC | Age: 68
End: 2018-10-22
Payer: COMMERCIAL

## 2018-10-22 ENCOUNTER — HOSPITAL ENCOUNTER (OUTPATIENT)
Age: 68
Discharge: HOME | End: 2018-10-24
Payer: COMMERCIAL

## 2018-10-22 VITALS
BODY MASS INDEX: 22.84 KG/M2 | HEIGHT: 74 IN | SYSTOLIC BLOOD PRESSURE: 190 MMHG | WEIGHT: 178 LBS | HEART RATE: 71 BPM | DIASTOLIC BLOOD PRESSURE: 96 MMHG | OXYGEN SATURATION: 98 %

## 2018-10-22 DIAGNOSIS — E78.5 DYSLIPIDEMIA: Chronic | ICD-10-CM

## 2018-10-22 DIAGNOSIS — J44.9 STAGE 2 MODERATE COPD BY GOLD CLASSIFICATION (HCC): Chronic | ICD-10-CM

## 2018-10-22 DIAGNOSIS — I10 ESSENTIAL HYPERTENSION: Primary | Chronic | ICD-10-CM

## 2018-10-22 DIAGNOSIS — I10 ESSENTIAL HYPERTENSION: Chronic | ICD-10-CM

## 2018-10-22 PROCEDURE — 4004F PT TOBACCO SCREEN RCVD TLK: CPT | Performed by: FAMILY MEDICINE

## 2018-10-22 PROCEDURE — 4040F PNEUMOC VAC/ADMIN/RCVD: CPT | Performed by: FAMILY MEDICINE

## 2018-10-22 PROCEDURE — 36415 COLL VENOUS BLD VENIPUNCTURE: CPT

## 2018-10-22 PROCEDURE — 85025 COMPLETE CBC W/AUTO DIFF WBC: CPT

## 2018-10-22 PROCEDURE — 1101F PT FALLS ASSESS-DOCD LE1/YR: CPT | Performed by: FAMILY MEDICINE

## 2018-10-22 PROCEDURE — G8420 CALC BMI NORM PARAMETERS: HCPCS | Performed by: FAMILY MEDICINE

## 2018-10-22 PROCEDURE — G8427 DOCREV CUR MEDS BY ELIG CLIN: HCPCS | Performed by: FAMILY MEDICINE

## 2018-10-22 PROCEDURE — 99213 OFFICE O/P EST LOW 20 MIN: CPT | Performed by: FAMILY MEDICINE

## 2018-10-22 PROCEDURE — G8926 SPIRO NO PERF OR DOC: HCPCS | Performed by: FAMILY MEDICINE

## 2018-10-22 PROCEDURE — 80053 COMPREHEN METABOLIC PANEL: CPT

## 2018-10-22 PROCEDURE — 80061 LIPID PANEL: CPT

## 2018-10-22 PROCEDURE — 3023F SPIROM DOC REV: CPT | Performed by: FAMILY MEDICINE

## 2018-10-22 PROCEDURE — 3017F COLORECTAL CA SCREEN DOC REV: CPT | Performed by: FAMILY MEDICINE

## 2018-10-22 PROCEDURE — G8484 FLU IMMUNIZE NO ADMIN: HCPCS | Performed by: FAMILY MEDICINE

## 2018-10-22 PROCEDURE — 1123F ACP DISCUSS/DSCN MKR DOCD: CPT | Performed by: FAMILY MEDICINE

## 2018-10-22 RX ORDER — CLONIDINE HYDROCHLORIDE 0.1 MG/1
0.1 TABLET ORAL 2 TIMES DAILY
Qty: 60 TABLET | Refills: 3 | Status: SHIPPED | OUTPATIENT
Start: 2018-10-22 | End: 2019-09-05 | Stop reason: SDUPTHER

## 2018-10-22 ASSESSMENT — ENCOUNTER SYMPTOMS
SHORTNESS OF BREATH: 1
COUGH: 1

## 2018-10-22 NOTE — PROGRESS NOTES
and given to patient at the end of visit.       Counseled regarding above diagnosis, including possible risks and complications, especially if left uncontrolled.     Counseled regarding the possible side effects, risks, benefits and alternatives to treatment; patient and/or guardian verbalizes understanding, agrees, feels comfortable with and wishes to proceed with above treatment plan.     Advised patient to call with any new medication issues, and read all Rx info from pharmacy to assure aware of all possible risks and side effects of medication before taking.     Srinivsaan Monterroso DO  10/22/18  4:35 PM

## 2018-10-23 LAB
ALBUMIN SERPL-MCNC: 4.2 G/DL (ref 3.5–5.2)
ALP BLD-CCNC: 112 U/L (ref 40–129)
ALT SERPL-CCNC: 23 U/L (ref 0–40)
ANION GAP SERPL CALCULATED.3IONS-SCNC: 11 MMOL/L (ref 7–16)
AST SERPL-CCNC: 16 U/L (ref 0–39)
BASOPHILS ABSOLUTE: 0.06 E9/L (ref 0–0.2)
BASOPHILS RELATIVE PERCENT: 0.6 % (ref 0–2)
BILIRUB SERPL-MCNC: 0.5 MG/DL (ref 0–1.2)
BUN BLDV-MCNC: 13 MG/DL (ref 8–23)
CALCIUM SERPL-MCNC: 9.5 MG/DL (ref 8.6–10.2)
CHLORIDE BLD-SCNC: 101 MMOL/L (ref 98–107)
CHOLESTEROL, TOTAL: 193 MG/DL (ref 0–199)
CO2: 27 MMOL/L (ref 22–29)
CREAT SERPL-MCNC: 0.9 MG/DL (ref 0.7–1.2)
EOSINOPHILS ABSOLUTE: 0.28 E9/L (ref 0.05–0.5)
EOSINOPHILS RELATIVE PERCENT: 2.8 % (ref 0–6)
GFR AFRICAN AMERICAN: >60
GFR NON-AFRICAN AMERICAN: >60 ML/MIN/1.73
GLUCOSE BLD-MCNC: 94 MG/DL (ref 74–109)
HCT VFR BLD CALC: 50 % (ref 37–54)
HDLC SERPL-MCNC: 33 MG/DL
HEMOGLOBIN: 16.3 G/DL (ref 12.5–16.5)
IMMATURE GRANULOCYTES #: 0.05 E9/L
IMMATURE GRANULOCYTES %: 0.5 % (ref 0–5)
LDL CHOLESTEROL CALCULATED: 137 MG/DL (ref 0–99)
LYMPHOCYTES ABSOLUTE: 3.08 E9/L (ref 1.5–4)
LYMPHOCYTES RELATIVE PERCENT: 30.6 % (ref 20–42)
MCH RBC QN AUTO: 28 PG (ref 26–35)
MCHC RBC AUTO-ENTMCNC: 32.6 % (ref 32–34.5)
MCV RBC AUTO: 85.9 FL (ref 80–99.9)
MONOCYTES ABSOLUTE: 1.05 E9/L (ref 0.1–0.95)
MONOCYTES RELATIVE PERCENT: 10.4 % (ref 2–12)
NEUTROPHILS ABSOLUTE: 5.55 E9/L (ref 1.8–7.3)
NEUTROPHILS RELATIVE PERCENT: 55.1 % (ref 43–80)
PDW BLD-RTO: 13.7 FL (ref 11.5–15)
PLATELET # BLD: 312 E9/L (ref 130–450)
PMV BLD AUTO: 11.2 FL (ref 7–12)
POTASSIUM SERPL-SCNC: 4.1 MMOL/L (ref 3.5–5)
RBC # BLD: 5.82 E12/L (ref 3.8–5.8)
SODIUM BLD-SCNC: 139 MMOL/L (ref 132–146)
TOTAL PROTEIN: 7.8 G/DL (ref 6.4–8.3)
TRIGL SERPL-MCNC: 117 MG/DL (ref 0–149)
VLDLC SERPL CALC-MCNC: 23 MG/DL
WBC # BLD: 10.1 E9/L (ref 4.5–11.5)

## 2019-03-11 DIAGNOSIS — R06.2 WHEEZING: ICD-10-CM

## 2019-08-28 ENCOUNTER — TELEPHONE (OUTPATIENT)
Dept: ADMINISTRATIVE | Age: 69
End: 2019-08-28

## 2019-09-05 ENCOUNTER — OFFICE VISIT (OUTPATIENT)
Dept: FAMILY MEDICINE CLINIC | Age: 69
End: 2019-09-05
Payer: COMMERCIAL

## 2019-09-05 VITALS
HEIGHT: 73 IN | BODY MASS INDEX: 23.86 KG/M2 | TEMPERATURE: 97.9 F | OXYGEN SATURATION: 95 % | SYSTOLIC BLOOD PRESSURE: 177 MMHG | WEIGHT: 180 LBS | HEART RATE: 66 BPM | DIASTOLIC BLOOD PRESSURE: 92 MMHG

## 2019-09-05 DIAGNOSIS — E78.5 DYSLIPIDEMIA: ICD-10-CM

## 2019-09-05 DIAGNOSIS — Z76.0 MEDICATION REFILL: ICD-10-CM

## 2019-09-05 DIAGNOSIS — J44.9 STAGE 2 MODERATE COPD BY GOLD CLASSIFICATION (HCC): Chronic | ICD-10-CM

## 2019-09-05 DIAGNOSIS — F39 MOOD DISORDER (HCC): ICD-10-CM

## 2019-09-05 DIAGNOSIS — I10 ESSENTIAL HYPERTENSION: Primary | ICD-10-CM

## 2019-09-05 DIAGNOSIS — D58.2 ELEVATED HEMOGLOBIN (HCC): ICD-10-CM

## 2019-09-05 PROCEDURE — 1123F ACP DISCUSS/DSCN MKR DOCD: CPT | Performed by: FAMILY MEDICINE

## 2019-09-05 PROCEDURE — G8926 SPIRO NO PERF OR DOC: HCPCS | Performed by: FAMILY MEDICINE

## 2019-09-05 PROCEDURE — 99213 OFFICE O/P EST LOW 20 MIN: CPT | Performed by: FAMILY MEDICINE

## 2019-09-05 PROCEDURE — 3023F SPIROM DOC REV: CPT | Performed by: FAMILY MEDICINE

## 2019-09-05 PROCEDURE — G8428 CUR MEDS NOT DOCUMENT: HCPCS | Performed by: FAMILY MEDICINE

## 2019-09-05 PROCEDURE — 3017F COLORECTAL CA SCREEN DOC REV: CPT | Performed by: FAMILY MEDICINE

## 2019-09-05 PROCEDURE — 4004F PT TOBACCO SCREEN RCVD TLK: CPT | Performed by: FAMILY MEDICINE

## 2019-09-05 PROCEDURE — 4040F PNEUMOC VAC/ADMIN/RCVD: CPT | Performed by: FAMILY MEDICINE

## 2019-09-05 PROCEDURE — G8420 CALC BMI NORM PARAMETERS: HCPCS | Performed by: FAMILY MEDICINE

## 2019-09-05 RX ORDER — ALBUTEROL SULFATE 90 UG/1
AEROSOL, METERED RESPIRATORY (INHALATION)
Qty: 8.5 G | Refills: 5 | Status: SHIPPED
Start: 2019-09-05 | End: 2021-03-31 | Stop reason: SDUPTHER

## 2019-09-05 RX ORDER — BUDESONIDE AND FORMOTEROL FUMARATE DIHYDRATE 160; 4.5 UG/1; UG/1
2 AEROSOL RESPIRATORY (INHALATION) 2 TIMES DAILY
Qty: 1 INHALER | Refills: 5 | Status: SHIPPED
Start: 2019-09-05 | End: 2020-09-17

## 2019-09-05 RX ORDER — AMLODIPINE BESYLATE 10 MG/1
10 TABLET ORAL DAILY
Qty: 30 TABLET | Refills: 5 | Status: SHIPPED
Start: 2019-09-05 | End: 2020-09-17 | Stop reason: CLARIF

## 2019-09-05 RX ORDER — LISINOPRIL 40 MG/1
TABLET ORAL
Qty: 30 TABLET | Refills: 5 | Status: SHIPPED
Start: 2019-09-05 | End: 2020-09-17 | Stop reason: SDUPTHER

## 2019-09-05 RX ORDER — HYDROCHLOROTHIAZIDE 25 MG/1
25 TABLET ORAL DAILY
Qty: 30 TABLET | Refills: 5 | Status: SHIPPED
Start: 2019-09-05 | End: 2020-09-17 | Stop reason: SDUPTHER

## 2019-09-05 RX ORDER — ATORVASTATIN CALCIUM 20 MG/1
20 TABLET, FILM COATED ORAL NIGHTLY
Qty: 30 TABLET | Refills: 5 | Status: SHIPPED
Start: 2019-09-05 | End: 2020-09-17 | Stop reason: SDUPTHER

## 2019-09-05 RX ORDER — GABAPENTIN 600 MG/1
600 TABLET ORAL 4 TIMES DAILY
Qty: 120 TABLET | Refills: 0 | Status: SHIPPED | OUTPATIENT
Start: 2019-09-05 | End: 2019-09-30 | Stop reason: SDUPTHER

## 2019-09-05 RX ORDER — CLONIDINE HYDROCHLORIDE 0.1 MG/1
0.1 TABLET ORAL 2 TIMES DAILY
Qty: 60 TABLET | Refills: 5 | Status: SHIPPED
Start: 2019-09-05 | End: 2020-09-17 | Stop reason: SDUPTHER

## 2019-09-05 ASSESSMENT — ENCOUNTER SYMPTOMS
COUGH: 1
SHORTNESS OF BREATH: 1

## 2019-09-05 NOTE — PROGRESS NOTES
NEEDED FOR WHEEZING  Dispense: 8.5 g; Refill: 5  - lisinopril (PRINIVIL;ZESTRIL) 40 MG tablet; TAKE ONE TABLET BY MOUTH DAILY  Dispense: 30 tablet; Refill: 5  - cloNIDine (CATAPRES) 0.1 MG tablet; Take 1 tablet by mouth 2 times daily  Dispense: 60 tablet; Refill: 5  - gabapentin (NEURONTIN) 600 MG tablet; Take 1 tablet by mouth 4 times daily. Dispense: 120 tablet; Refill: 0  - hydrochlorothiazide (HYDRODIURIL) 25 MG tablet; Take 1 tablet by mouth daily  Dispense: 30 tablet; Refill: 5  - budesonide-formoterol (SYMBICORT) 160-4.5 MCG/ACT AERO; Inhale 2 puffs into the lungs 2 times daily  Dispense: 1 Inhaler; Refill: 5  - atorvastatin (LIPITOR) 20 MG tablet; Take 1 tablet by mouth nightly  Dispense: 30 tablet; Refill: 5  - amLODIPine (NORVASC) 10 MG tablet; Take 1 tablet by mouth daily  Dispense: 30 tablet; Refill: 5    5. Elevated hemoglobin (HCC)  - Lipid Panel; Future    6. Mood disorder (HCC)  Refilled gabapentin for 30 days. Advised pt that this will be refilled until he is able to establish with a new psychiatrist, advised to schedule an apt as soon as possible. Return in about 2 weeks (around 9/19/2019) for lab review, BP check, or sooner as needed. , Labs are to be done one week prior to next visit. .     Byron Benton DO  09/05/19  5:25 PM

## 2019-09-16 ENCOUNTER — TELEPHONE (OUTPATIENT)
Dept: FAMILY MEDICINE CLINIC | Age: 69
End: 2019-09-16

## 2019-09-30 DIAGNOSIS — Z76.0 MEDICATION REFILL: ICD-10-CM

## 2019-10-01 RX ORDER — GABAPENTIN 600 MG/1
TABLET ORAL
Qty: 120 TABLET | Refills: 0 | Status: SHIPPED
Start: 2019-10-01 | End: 2021-03-31 | Stop reason: SDUPTHER

## 2020-08-07 RX ORDER — LISINOPRIL 40 MG/1
TABLET ORAL
Qty: 30 TABLET | Refills: 2 | OUTPATIENT
Start: 2020-08-07

## 2020-09-24 ENCOUNTER — HOSPITAL ENCOUNTER (OUTPATIENT)
Age: 70
Discharge: HOME | End: 2020-09-26
Payer: MEDICARE

## 2020-09-24 LAB
ALBUMIN SERPL-MCNC: 4.5 G/DL (ref 3.5–5.2)
ALP BLD-CCNC: 92 U/L (ref 40–129)
ALT SERPL-CCNC: 23 U/L (ref 0–40)
ANION GAP SERPL CALCULATED.3IONS-SCNC: 17 MMOL/L (ref 7–16)
AST SERPL-CCNC: 19 U/L (ref 0–39)
BASOPHILS ABSOLUTE: 0.05 E9/L (ref 0–0.2)
BASOPHILS RELATIVE PERCENT: 0.5 % (ref 0–2)
BILIRUB SERPL-MCNC: 0.7 MG/DL (ref 0–1.2)
BUN BLDV-MCNC: 22 MG/DL (ref 8–23)
CALCIUM SERPL-MCNC: 9.8 MG/DL (ref 8.6–10.2)
CHLORIDE BLD-SCNC: 95 MMOL/L (ref 98–107)
CHOLESTEROL, TOTAL: 151 MG/DL (ref 0–199)
CO2: 27 MMOL/L (ref 22–29)
CREAT SERPL-MCNC: 1.1 MG/DL (ref 0.7–1.2)
EOSINOPHILS ABSOLUTE: 0.25 E9/L (ref 0.05–0.5)
EOSINOPHILS RELATIVE PERCENT: 2.6 % (ref 0–6)
GFR AFRICAN AMERICAN: >60
GFR NON-AFRICAN AMERICAN: >60 ML/MIN/1.73
GLUCOSE BLD-MCNC: 167 MG/DL (ref 74–99)
HCT VFR BLD CALC: 51.3 % (ref 37–54)
HDLC SERPL-MCNC: 38 MG/DL
HEMOGLOBIN: 16.7 G/DL (ref 12.5–16.5)
IMMATURE GRANULOCYTES #: 0.03 E9/L
IMMATURE GRANULOCYTES %: 0.3 % (ref 0–5)
LDL CHOLESTEROL CALCULATED: 93 MG/DL (ref 0–99)
LYMPHOCYTES ABSOLUTE: 2.43 E9/L (ref 1.5–4)
LYMPHOCYTES RELATIVE PERCENT: 25.6 % (ref 20–42)
MCH RBC QN AUTO: 28.9 PG (ref 26–35)
MCHC RBC AUTO-ENTMCNC: 32.6 % (ref 32–34.5)
MCV RBC AUTO: 88.8 FL (ref 80–99.9)
MONOCYTES ABSOLUTE: 0.85 E9/L (ref 0.1–0.95)
MONOCYTES RELATIVE PERCENT: 8.9 % (ref 2–12)
NEUTROPHILS ABSOLUTE: 5.9 E9/L (ref 1.8–7.3)
NEUTROPHILS RELATIVE PERCENT: 62.1 % (ref 43–80)
PDW BLD-RTO: 13.7 FL (ref 11.5–15)
PLATELET # BLD: 290 E9/L (ref 130–450)
PMV BLD AUTO: 11.4 FL (ref 7–12)
POTASSIUM SERPL-SCNC: 3.7 MMOL/L (ref 3.5–5)
RBC # BLD: 5.78 E12/L (ref 3.8–5.8)
SODIUM BLD-SCNC: 139 MMOL/L (ref 132–146)
TOTAL PROTEIN: 7.3 G/DL (ref 6.4–8.3)
TRIGL SERPL-MCNC: 99 MG/DL (ref 0–149)
VLDLC SERPL CALC-MCNC: 20 MG/DL
WBC # BLD: 9.5 E9/L (ref 4.5–11.5)

## 2020-09-24 PROCEDURE — 80074 ACUTE HEPATITIS PANEL: CPT

## 2020-09-24 PROCEDURE — 80053 COMPREHEN METABOLIC PANEL: CPT

## 2020-09-24 PROCEDURE — 86695 HERPES SIMPLEX TYPE 1 TEST: CPT

## 2020-09-24 PROCEDURE — 80061 LIPID PANEL: CPT

## 2020-09-24 PROCEDURE — 86696 HERPES SIMPLEX TYPE 2 TEST: CPT

## 2020-09-24 PROCEDURE — 86694 HERPES SIMPLEX NES ANTBDY: CPT

## 2020-09-24 PROCEDURE — 85025 COMPLETE CBC W/AUTO DIFF WBC: CPT

## 2020-09-24 PROCEDURE — 86592 SYPHILIS TEST NON-TREP QUAL: CPT

## 2020-09-24 PROCEDURE — 86703 HIV-1/HIV-2 1 RESULT ANTBDY: CPT

## 2020-09-25 LAB
HAV IGM SER IA-ACNC: NORMAL
HEPATITIS B CORE IGM ANTIBODY: NORMAL
HEPATITIS B SURFACE ANTIGEN INTERPRETATION: NORMAL
HEPATITIS C ANTIBODY INTERPRETATION: NORMAL
HIV-1 AND HIV-2 ANTIBODIES: NORMAL
RPR: NORMAL

## 2020-09-30 LAB
HERPES TYPE 1/2 IGM COMBINED: 0.81 IV
HERPES TYPE I/II IGG COMBINED: >22.4 IV
HSV 1 GLYCOPROTEIN G AB IGG: 54.9 IV
HSV 2 GLYCOPROTEIN G AB IGG: 0.28 IV

## 2020-10-15 PROBLEM — Z23 NEED FOR TDAP VACCINATION: Status: ACTIVE | Noted: 2020-10-15

## 2020-10-15 PROBLEM — Z23 NEED FOR PNEUMOCOCCAL VACCINATION: Status: ACTIVE | Noted: 2020-10-15

## 2020-10-15 PROBLEM — Z23 NEED FOR INFLUENZA VACCINATION: Status: ACTIVE | Noted: 2020-10-15

## 2020-10-15 PROBLEM — Z13.6 SCREENING FOR AAA (ABDOMINAL AORTIC ANEURYSM): Status: ACTIVE | Noted: 2020-10-15

## 2020-11-14 PROBLEM — Z23 NEED FOR INFLUENZA VACCINATION: Status: RESOLVED | Noted: 2020-10-15 | Resolved: 2020-11-14

## 2020-11-14 PROBLEM — Z13.6 SCREENING FOR AAA (ABDOMINAL AORTIC ANEURYSM): Status: RESOLVED | Noted: 2020-10-15 | Resolved: 2020-11-14

## 2022-08-09 PROBLEM — Z23 NEED FOR TDAP VACCINATION: Status: RESOLVED | Noted: 2020-10-15 | Resolved: 2022-08-09

## 2022-08-09 PROBLEM — Z23 NEED FOR SHINGLES VACCINE: Status: RESOLVED | Noted: 2020-10-15 | Resolved: 2022-08-09

## 2023-03-09 PROBLEM — D58.2 ELEVATED HEMOGLOBIN (HCC): Status: RESOLVED | Noted: 2018-01-05 | Resolved: 2023-03-09

## 2023-06-22 ENCOUNTER — APPOINTMENT (OUTPATIENT)
Dept: GENERAL RADIOLOGY | Age: 73
End: 2023-06-22
Payer: MEDICARE

## 2023-06-22 ENCOUNTER — HOSPITAL ENCOUNTER (EMERGENCY)
Age: 73
Discharge: LEFT BEFORE DISCHARGE | End: 2023-06-22
Attending: EMERGENCY MEDICINE
Payer: MEDICARE

## 2023-06-22 VITALS
HEART RATE: 35 BPM | TEMPERATURE: 97.5 F | DIASTOLIC BLOOD PRESSURE: 74 MMHG | SYSTOLIC BLOOD PRESSURE: 150 MMHG | RESPIRATION RATE: 18 BRPM | OXYGEN SATURATION: 99 %

## 2023-06-22 DIAGNOSIS — J44.9 STAGE 2 MODERATE COPD BY GOLD CLASSIFICATION (HCC): ICD-10-CM

## 2023-06-22 DIAGNOSIS — E78.5 DYSLIPIDEMIA: ICD-10-CM

## 2023-06-22 DIAGNOSIS — I44.2 COMPLETE HEART BLOCK (HCC): Primary | ICD-10-CM

## 2023-06-22 DIAGNOSIS — I10 ESSENTIAL HYPERTENSION: ICD-10-CM

## 2023-06-22 DIAGNOSIS — N52.9 ERECTILE DYSFUNCTION, UNSPECIFIED ERECTILE DYSFUNCTION TYPE: ICD-10-CM

## 2023-06-22 DIAGNOSIS — R97.20 ELEVATED PSA: ICD-10-CM

## 2023-06-22 DIAGNOSIS — F39 MOOD DISORDER (HCC): ICD-10-CM

## 2023-06-22 DIAGNOSIS — E55.9 VITAMIN D DEFICIENCY: ICD-10-CM

## 2023-06-22 LAB
ALBUMIN SERPL-MCNC: 4.2 G/DL (ref 3.5–5.2)
ALBUMIN SERPL-MCNC: 4.5 G/DL (ref 3.5–5.2)
ALP SERPL-CCNC: 83 U/L (ref 40–129)
ALP SERPL-CCNC: 88 U/L (ref 40–129)
ALT SERPL-CCNC: 26 U/L (ref 0–40)
ALT SERPL-CCNC: 30 U/L (ref 0–40)
ANION GAP SERPL CALCULATED.3IONS-SCNC: 10 MMOL/L (ref 7–16)
ANION GAP SERPL CALCULATED.3IONS-SCNC: 18 MMOL/L (ref 7–16)
AST SERPL-CCNC: 17 U/L (ref 0–39)
AST SERPL-CCNC: 21 U/L (ref 0–39)
BASOPHILS # BLD: 0.06 E9/L (ref 0–0.2)
BASOPHILS # BLD: 0.08 E9/L (ref 0–0.2)
BASOPHILS NFR BLD: 0.6 % (ref 0–2)
BASOPHILS NFR BLD: 0.7 % (ref 0–2)
BILIRUB SERPL-MCNC: 0.5 MG/DL (ref 0–1.2)
BILIRUB SERPL-MCNC: 0.7 MG/DL (ref 0–1.2)
BNP BLD-MCNC: 110 PG/ML (ref 0–125)
BUN SERPL-MCNC: 21 MG/DL (ref 6–23)
BUN SERPL-MCNC: 23 MG/DL (ref 6–23)
CALCIUM SERPL-MCNC: 9.3 MG/DL (ref 8.6–10.2)
CALCIUM SERPL-MCNC: 9.8 MG/DL (ref 8.6–10.2)
CHLORIDE SERPL-SCNC: 102 MMOL/L (ref 98–107)
CHLORIDE SERPL-SCNC: 105 MMOL/L (ref 98–107)
CHOLESTEROL, TOTAL: 131 MG/DL (ref 0–199)
CO2 SERPL-SCNC: 21 MMOL/L (ref 22–29)
CO2 SERPL-SCNC: 24 MMOL/L (ref 22–29)
CREAT SERPL-MCNC: 1.1 MG/DL (ref 0.7–1.2)
CREAT SERPL-MCNC: 1.1 MG/DL (ref 0.7–1.2)
EOSINOPHIL # BLD: 0.3 E9/L (ref 0.05–0.5)
EOSINOPHIL # BLD: 0.31 E9/L (ref 0.05–0.5)
EOSINOPHIL NFR BLD: 2.7 % (ref 0–6)
EOSINOPHIL NFR BLD: 2.9 % (ref 0–6)
ERYTHROCYTE [DISTWIDTH] IN BLOOD BY AUTOMATED COUNT: 14.3 FL (ref 11.5–15)
ERYTHROCYTE [DISTWIDTH] IN BLOOD BY AUTOMATED COUNT: 14.5 FL (ref 11.5–15)
FOLATE SERPL-MCNC: 13.6 NG/ML (ref 4.8–24.2)
GLUCOSE SERPL-MCNC: 90 MG/DL (ref 74–99)
GLUCOSE SERPL-MCNC: 96 MG/DL (ref 74–99)
HCT VFR BLD AUTO: 47.4 % (ref 37–54)
HCT VFR BLD AUTO: 50.4 % (ref 37–54)
HDLC SERPL-MCNC: 32 MG/DL
HGB BLD-MCNC: 15.4 G/DL (ref 12.5–16.5)
HGB BLD-MCNC: 16.3 G/DL (ref 12.5–16.5)
IMM GRANULOCYTES # BLD: 0.05 E9/L
IMM GRANULOCYTES # BLD: 0.07 E9/L
IMM GRANULOCYTES NFR BLD: 0.5 % (ref 0–5)
IMM GRANULOCYTES NFR BLD: 0.6 % (ref 0–5)
LDLC SERPL CALC-MCNC: 76 MG/DL (ref 0–99)
LV EF: 65 %
LVEF MODALITY: NORMAL
LYMPHOCYTES # BLD: 3.52 E9/L (ref 1.5–4)
LYMPHOCYTES # BLD: 3.74 E9/L (ref 1.5–4)
LYMPHOCYTES NFR BLD: 32.3 % (ref 20–42)
LYMPHOCYTES NFR BLD: 34.2 % (ref 20–42)
MAGNESIUM SERPL-MCNC: 2.2 MG/DL (ref 1.6–2.6)
MCH RBC QN AUTO: 28.6 PG (ref 26–35)
MCH RBC QN AUTO: 28.9 PG (ref 26–35)
MCHC RBC AUTO-ENTMCNC: 32.3 % (ref 32–34.5)
MCHC RBC AUTO-ENTMCNC: 32.5 % (ref 32–34.5)
MCV RBC AUTO: 87.9 FL (ref 80–99.9)
MCV RBC AUTO: 89.4 FL (ref 80–99.9)
MONOCYTES # BLD: 0.99 E9/L (ref 0.1–0.95)
MONOCYTES # BLD: 1.25 E9/L (ref 0.1–0.95)
MONOCYTES NFR BLD: 10.8 % (ref 2–12)
MONOCYTES NFR BLD: 9.6 % (ref 2–12)
NEUTROPHILS # BLD: 5.38 E9/L (ref 1.8–7.3)
NEUTROPHILS # BLD: 6.12 E9/L (ref 1.8–7.3)
NEUTS SEG NFR BLD: 52.2 % (ref 43–80)
NEUTS SEG NFR BLD: 52.9 % (ref 43–80)
PLATELET # BLD AUTO: 218 E9/L (ref 130–450)
PLATELET # BLD AUTO: 233 E9/L (ref 130–450)
PMV BLD AUTO: 10.3 FL (ref 7–12)
PMV BLD AUTO: 11.1 FL (ref 7–12)
POTASSIUM SERPL-SCNC: 4 MMOL/L (ref 3.5–5)
POTASSIUM SERPL-SCNC: 4.4 MMOL/L (ref 3.5–5)
PROT SERPL-MCNC: 6.6 G/DL (ref 6.4–8.3)
PROT SERPL-MCNC: 7.3 G/DL (ref 6.4–8.3)
RBC # BLD AUTO: 5.39 E12/L (ref 3.8–5.8)
RBC # BLD AUTO: 5.64 E12/L (ref 3.8–5.8)
SODIUM SERPL-SCNC: 139 MMOL/L (ref 132–146)
SODIUM SERPL-SCNC: 141 MMOL/L (ref 132–146)
T4 FREE SERPL-MCNC: 1.05 NG/DL (ref 0.93–1.7)
TRIGL SERPL-MCNC: 117 MG/DL (ref 0–149)
TROPONIN, HIGH SENSITIVITY: 22 NG/L (ref 0–11)
TROPONIN, HIGH SENSITIVITY: 23 NG/L (ref 0–11)
TSH SERPL-MCNC: 2.18 UIU/ML (ref 0.27–4.2)
VIT B12 SERPL-MCNC: 498 PG/ML (ref 211–946)
VITAMIN D 25-HYDROXY: 11 NG/ML (ref 30–100)
VLDLC SERPL CALC-MCNC: 23 MG/DL
WBC # BLD: 10.3 E9/L (ref 4.5–11.5)
WBC # BLD: 11.6 E9/L (ref 4.5–11.5)

## 2023-06-22 PROCEDURE — 99285 EMERGENCY DEPT VISIT HI MDM: CPT

## 2023-06-22 PROCEDURE — 93308 TTE F-UP OR LMTD: CPT

## 2023-06-22 PROCEDURE — 84484 ASSAY OF TROPONIN QUANT: CPT

## 2023-06-22 PROCEDURE — 83880 ASSAY OF NATRIURETIC PEPTIDE: CPT

## 2023-06-22 PROCEDURE — 83735 ASSAY OF MAGNESIUM: CPT

## 2023-06-22 PROCEDURE — 71045 X-RAY EXAM CHEST 1 VIEW: CPT

## 2023-06-22 PROCEDURE — 99285 EMERGENCY DEPT VISIT HI MDM: CPT | Performed by: INTERNAL MEDICINE

## 2023-06-22 PROCEDURE — 80053 COMPREHEN METABOLIC PANEL: CPT

## 2023-06-22 PROCEDURE — 85025 COMPLETE CBC W/AUTO DIFF WBC: CPT

## 2023-06-22 PROCEDURE — 93005 ELECTROCARDIOGRAM TRACING: CPT | Performed by: PHYSICIAN ASSISTANT

## 2023-06-22 RX ORDER — LISINOPRIL 40 MG/1
40 TABLET ORAL DAILY
COMMUNITY
End: 2023-09-14 | Stop reason: SDUPTHER

## 2023-06-22 RX ORDER — HYDROCHLOROTHIAZIDE 25 MG/1
25 TABLET ORAL DAILY
COMMUNITY
End: 2023-09-14 | Stop reason: SDUPTHER

## 2023-06-22 RX ORDER — ALBUTEROL SULFATE 90 UG/1
2 AEROSOL, METERED RESPIRATORY (INHALATION) EVERY 6 HOURS PRN
COMMUNITY

## 2023-06-22 RX ORDER — GABAPENTIN 600 MG/1
600 TABLET ORAL DAILY
COMMUNITY
End: 2023-09-14 | Stop reason: SDUPTHER

## 2023-06-22 RX ORDER — AMLODIPINE BESYLATE 10 MG/1
10 TABLET ORAL DAILY
COMMUNITY
End: 2023-09-14 | Stop reason: SDUPTHER

## 2023-06-22 ASSESSMENT — LIFESTYLE VARIABLES
HOW OFTEN DO YOU HAVE A DRINK CONTAINING ALCOHOL: NEVER
HOW MANY STANDARD DRINKS CONTAINING ALCOHOL DO YOU HAVE ON A TYPICAL DAY: PATIENT DOES NOT DRINK

## 2023-06-22 NOTE — CONSULTS
[address] [facility]  Consultation Report  PATIENT: Harish Carroll  MEDICAL RECORD NUMBER: [id number]  DATE OF SERVICE:  6/22/2023  ATTENDING ELECTROPHYSIOLOGIST: Bronson Jones MD  PRIMARY ELECTROPHYSIOLOGIST: Rickie Mijares  REFERRING PHYSICIAN: No ref. provider found and Sena Flurry, DO  CHIEF COMPLAINT: Weakness, low heart rate    HPI: This is a 71 y.o. male with a history of hypertension,COPD, ongoing smoking habit who was seen by his primary care physician today for a routine follow-up visit and found to be in complete heart block. The patient was sent to the emergency room with a heart rate of between 30 and 40 bpm for urgent cardiac intervention. The patient says that he has had no symptoms of chest pain, dyspnea on exertion but has noticed a low heart rate when he measures his blood pressure and heart rate at home regularly. He also admits to  an occasional episode of severe dizziness or near syncope but no syncopal episodes so far. No symptoms of paroxysmal nocturnal dyspnea, orthopnea or leg edema either. No prior cardiac history. In the emergency room the patient was found to be in complete heart block as noted above with blood pressure of over 236 mmHg systolic. Initial laboratory work-up revealed normal electrolytes, renal indices and liver function tests. Cardiac electrophysiology service is consulted for complete heart block noted on EKG.     Patient Active Problem List    Diagnosis Date Noted    Stage 2 moderate COPD by GOLD classification ([address]) 07/13/2018    Mood disorder ([address]) 04/09/2018    Dyslipidemia 01/05/2018    Tobacco abuse 11/14/2017    Essential hypertension 11/14/2017    Fatigue 11/14/2017       Past Medical History:   Diagnosis Date    Anxiety and depression     Bipolar 1 disorder (HCC)     Hypertension     Obsessive compulsive disorder     PTSD (post-traumatic stress disorder)        Family History   Problem the family reviewing the above stated recommendations. In fact I saw the patient twice during the day today trying to convince him to stay in the hospital and proceed with a pacemaker implant as soon as possible to avoid syncope/sudden cardiac death, and a total of >50% of that time involved face-to-face time providing counseling and or coordination of care with the other providers, reviewing records/tests, counseling/education of the patient, ordering medications/tests/procedures, coordinating care, and documenting clinical information in the EHR. Thank you for allowing me to participate in your patient's care. Please call me if there are any questions or concerns.       Beba Sandoval MD  Cardiac Electrophysiology  [facility]) [facility]  [facility] [place]  5:47 PM  6/22/2023

## 2023-06-22 NOTE — ED NOTES
Received call from Dr. Cordell Coronel to instruct patient not to drive until returns for EP procedure.       Abby Ayers RN  06/22/23 [id number]

## 2023-06-22 NOTE — ED NOTES
Pt wishes to sign out AMA. Wife present during instruction. Dr. Ita Escobedo has been in to see patient and explained risks of signing out AMA vs benefits of admission. Pt able to voice risks of signing out AMA. Both patient and wife explained no driving until EP procedure done. Wife understands to call Dr. Ita Escobedo office to further set up procedure.        Lorraine James RN  06/22/23 3168

## 2023-06-22 NOTE — ED PROVIDER NOTES
[facility] ENCOUNTER        Pt Name: Humble Steele  MRN: [id number]  [place] 1950  Date of evaluation: 6/22/2023  Provider: Tello Branham DO  PCP: Sae Orozco DO  Note Started: 3:04 PM EDT 6/22/23    CHIEF COMPLAINT       Chief Complaint   Patient presents with    Bradycardia     Sent by PCP for 3rd degree heart block, feels SOB on exertion and fatigued. HISTORY OF PRESENT ILLNESS: 1 or more Elements        Limitations to history : None    Humble Steele is a 71 y.o. male who presents for slow heart rate. He states over the past few weeks he said shortness of breath on exertion and fatigue. He was seen by his PCP today who noted his heart rate to be in the 30s. According to the EKG he was in third-degree heart block. For the past 6 weeks, he notes his home heart rate monitor has showed \"low\". Nursing Notes were all reviewed and agreed with or any disagreements were addressed in the HPI. REVIEW OF EXTERNAL NOTE :    Reviewed office visit from today. Chart Review/External Note Review    Last Echo reviewed by Me:  No results found for: LVEF, LVEFMODE          Controlled Substance Monitoring:    Acute and Chronic Pain Monitoring:   No flowsheet data found. REVIEW OF SYSTEMS :      Positives and Pertinent negatives as per HPI. SURGICAL HISTORY   History reviewed. No pertinent surgical history. CURRENTMEDICATIONS       Previous Medications    ALBUTEROL SULFATE HFA (PROVENTIL;VENTOLIN;PROAIR) 108 (90 BASE) MCG/ACT INHALER    Inhale 2 puffs into the lungs every 6 hours as needed for Wheezing    AMLODIPINE (NORVASC) 10 MG TABLET    Take 1 tablet by mouth daily    ATORVASTATIN (LIPITOR) 20 MG TABLET    Take 1 tablet by mouth nightly    GABAPENTIN (NEURONTIN) 600 MG TABLET    Take 1 tablet by mouth daily.     HYDROCHLOROTHIAZIDE (HYDRODIURIL) 25 MG TABLET    Take 1 tablet by mouth daily edit the dictations but occasionally words are mis-transcribed.)    Willie Cabral DO (electronically signed)          Willie Cabral DO  06/22/23 1020

## 2023-06-23 ENCOUNTER — TELEPHONE (OUTPATIENT)
Dept: CARDIOLOGY CLINIC | Age: 73
End: 2023-06-23

## 2023-06-23 LAB
EKG ATRIAL RATE: 38 BPM
EKG P AXIS: 74 DEGREES
EKG Q-T INTERVAL: 550 MS
EKG QRS DURATION: 156 MS
EKG QTC CALCULATION (BAZETT): 431 MS
EKG R AXIS: 88 DEGREES
EKG T AXIS: 64 DEGREES
EKG VENTRICULAR RATE: 37 BPM

## 2023-06-23 PROCEDURE — 93010 ELECTROCARDIOGRAM REPORT: CPT | Performed by: INTERNAL MEDICINE

## 2023-06-25 LAB
PSA FREE MFR SERPL: 18 %
PSA FREE SERPL-MCNC: 0.7 NG/ML
PSA SERPL-MCNC: 4 NG/ML (ref 0–4)

## 2023-06-26 ENCOUNTER — TELEPHONE (OUTPATIENT)
Dept: CARDIAC CATH/INVASIVE PROCEDURES | Age: 73
End: 2023-06-26

## 2023-06-27 ENCOUNTER — HOSPITAL ENCOUNTER (OUTPATIENT)
Dept: CARDIAC CATH/INVASIVE PROCEDURES | Age: 73
Setting detail: OBSERVATION
Discharge: HOME | End: 2023-06-28
Attending: INTERNAL MEDICINE | Admitting: INTERNAL MEDICINE
Payer: MEDICARE

## 2023-06-27 ENCOUNTER — HOSPITAL ENCOUNTER (OUTPATIENT)
Dept: GENERAL RADIOLOGY | Age: 73
Discharge: HOME | End: 2023-06-29
Attending: INTERNAL MEDICINE
Payer: MEDICARE

## 2023-06-27 ENCOUNTER — ANESTHESIA EVENT (OUTPATIENT)
Dept: CARDIAC CATH/INVASIVE PROCEDURES | Age: 73
End: 2023-06-27

## 2023-06-27 ENCOUNTER — APPOINTMENT (OUTPATIENT)
Dept: GENERAL RADIOLOGY | Age: 73
End: 2023-06-27
Attending: INTERNAL MEDICINE
Payer: MEDICARE

## 2023-06-27 ENCOUNTER — ANESTHESIA (OUTPATIENT)
Dept: CARDIAC CATH/INVASIVE PROCEDURES | Age: 73
End: 2023-06-27

## 2023-06-27 PROBLEM — Z95.0 S/P PLACEMENT OF CARDIAC PACEMAKER: Status: ACTIVE | Noted: 2023-06-27

## 2023-06-27 LAB
ALBUMIN SERPL-MCNC: 4.7 G/DL (ref 3.5–5.2)
ALP SERPL-CCNC: 87 U/L (ref 40–129)
ALT SERPL-CCNC: 27 U/L (ref 0–40)
ANION GAP SERPL CALCULATED.3IONS-SCNC: 13 MMOL/L (ref 7–16)
AST SERPL-CCNC: 18 U/L (ref 0–39)
BASOPHILS # BLD: 0.06 E9/L (ref 0–0.2)
BASOPHILS NFR BLD: 0.5 % (ref 0–2)
BILIRUB SERPL-MCNC: 0.9 MG/DL (ref 0–1.2)
BUN SERPL-MCNC: 23 MG/DL (ref 6–23)
CALCIUM SERPL-MCNC: 9.6 MG/DL (ref 8.6–10.2)
CHLORIDE SERPL-SCNC: 103 MMOL/L (ref 98–107)
CO2 SERPL-SCNC: 24 MMOL/L (ref 22–29)
CREAT SERPL-MCNC: 1.2 MG/DL (ref 0.7–1.2)
EOSINOPHIL # BLD: 0.26 E9/L (ref 0.05–0.5)
EOSINOPHIL NFR BLD: 2.2 % (ref 0–6)
ERYTHROCYTE [DISTWIDTH] IN BLOOD BY AUTOMATED COUNT: 14.4 FL (ref 11.5–15)
GLUCOSE SERPL-MCNC: 91 MG/DL (ref 74–99)
HCT VFR BLD AUTO: 51.3 % (ref 37–54)
HGB BLD-MCNC: 16.9 G/DL (ref 12.5–16.5)
IMM GRANULOCYTES # BLD: 0.05 E9/L
IMM GRANULOCYTES NFR BLD: 0.4 % (ref 0–5)
LYMPHOCYTES # BLD: 3.32 E9/L (ref 1.5–4)
LYMPHOCYTES NFR BLD: 28.7 % (ref 20–42)
MAGNESIUM SERPL-MCNC: 2.3 MG/DL (ref 1.6–2.6)
MCH RBC QN AUTO: 29 PG (ref 26–35)
MCHC RBC AUTO-ENTMCNC: 32.9 % (ref 32–34.5)
MCV RBC AUTO: 88.1 FL (ref 80–99.9)
MONOCYTES # BLD: 1.19 E9/L (ref 0.1–0.95)
MONOCYTES NFR BLD: 10.3 % (ref 2–12)
NEUTROPHILS # BLD: 6.68 E9/L (ref 1.8–7.3)
NEUTS SEG NFR BLD: 57.9 % (ref 43–80)
PLATELET # BLD AUTO: 237 E9/L (ref 130–450)
PMV BLD AUTO: 10.4 FL (ref 7–12)
POTASSIUM SERPL-SCNC: 3.9 MMOL/L (ref 3.5–5)
PROT SERPL-MCNC: 7.6 G/DL (ref 6.4–8.3)
RBC # BLD AUTO: 5.82 E12/L (ref 3.8–5.8)
SODIUM SERPL-SCNC: 140 MMOL/L (ref 132–146)
TSH SERPL-MCNC: 1.61 UIU/ML (ref 0.27–4.2)
WBC # BLD: 11.6 E9/L (ref 4.5–11.5)

## 2023-06-27 PROCEDURE — 71045 X-RAY EXAM CHEST 1 VIEW: CPT

## 2023-06-27 PROCEDURE — 6360000002 HC RX W HCPCS

## 2023-06-27 PROCEDURE — C1898 LEAD, PMKR, OTHER THAN TRANS: HCPCS

## 2023-06-27 PROCEDURE — C1785 PMKR, DUAL, RATE-RESP: HCPCS

## 2023-06-27 PROCEDURE — 2709999900 HC NON-CHARGEABLE SUPPLY

## 2023-06-27 PROCEDURE — 80053 COMPREHEN METABOLIC PANEL: CPT

## 2023-06-27 PROCEDURE — 2580000003 HC RX 258

## 2023-06-27 PROCEDURE — C1894 INTRO/SHEATH, NON-LASER: HCPCS

## 2023-06-27 PROCEDURE — 2500000003 HC RX 250 WO HCPCS: Performed by: NURSE ANESTHETIST, CERTIFIED REGISTERED

## 2023-06-27 PROCEDURE — 3700000000 HC ANESTHESIA ATTENDED CARE

## 2023-06-27 PROCEDURE — 33208 INSRT HEART PM ATRIAL & VENT: CPT | Performed by: INTERNAL MEDICINE

## 2023-06-27 PROCEDURE — G0378 HOSPITAL OBSERVATION PER HR: HCPCS

## 2023-06-27 PROCEDURE — 6360000002 HC RX W HCPCS: Performed by: NURSE ANESTHETIST, CERTIFIED REGISTERED

## 2023-06-27 PROCEDURE — 2580000003 HC RX 258: Performed by: NURSE ANESTHETIST, CERTIFIED REGISTERED

## 2023-06-27 PROCEDURE — G0379 DIRECT REFER HOSPITAL OBSERV: HCPCS

## 2023-06-27 PROCEDURE — 3700000001 HC ADD 15 MINUTES (ANESTHESIA)

## 2023-06-27 PROCEDURE — 36415 COLL VENOUS BLD VENIPUNCTURE: CPT

## 2023-06-27 PROCEDURE — 83735 ASSAY OF MAGNESIUM: CPT

## 2023-06-27 PROCEDURE — 6360000002 HC RX W HCPCS: Performed by: INTERNAL MEDICINE

## 2023-06-27 PROCEDURE — 6370000000 HC RX 637 (ALT 250 FOR IP): Performed by: INTERNAL MEDICINE

## 2023-06-27 PROCEDURE — 2580000003 HC RX 258: Performed by: INTERNAL MEDICINE

## 2023-06-27 PROCEDURE — C1769 GUIDE WIRE: HCPCS

## 2023-06-27 PROCEDURE — C1889 IMPLANT/INSERT DEVICE, NOC: HCPCS

## 2023-06-27 PROCEDURE — 33208 INSRT HEART PM ATRIAL & VENT: CPT

## 2023-06-27 PROCEDURE — 6370000000 HC RX 637 (ALT 250 FOR IP): Performed by: STUDENT IN AN ORGANIZED HEALTH CARE EDUCATION/TRAINING PROGRAM

## 2023-06-27 PROCEDURE — 84443 ASSAY THYROID STIM HORMONE: CPT

## 2023-06-27 PROCEDURE — 2500000003 HC RX 250 WO HCPCS

## 2023-06-27 PROCEDURE — 85025 COMPLETE CBC W/AUTO DIFF WBC: CPT

## 2023-06-27 RX ORDER — ONDANSETRON 4 MG/1
4 TABLET, ORALLY DISINTEGRATING ORAL EVERY 8 HOURS PRN
Status: DISCONTINUED | OUTPATIENT
Start: 2023-06-27 | End: 2023-06-28 | Stop reason: HOSPADM

## 2023-06-27 RX ORDER — ACETAMINOPHEN 325 MG/1
650 TABLET ORAL EVERY 4 HOURS PRN
Status: DISCONTINUED | OUTPATIENT
Start: 2023-06-27 | End: 2023-06-28 | Stop reason: HOSPADM

## 2023-06-27 RX ORDER — EPHEDRINE SULFATE/0.9% NACL/PF 50 MG/5 ML
SYRINGE (ML) INTRAVENOUS PRN
Status: DISCONTINUED | OUTPATIENT
Start: 2023-06-27 | End: 2023-06-27 | Stop reason: SDUPTHER

## 2023-06-27 RX ORDER — VANCOMYCIN HYDROCHLORIDE 1 G/20ML
INJECTION, POWDER, LYOPHILIZED, FOR SOLUTION INTRAVENOUS PRN
Status: DISCONTINUED | OUTPATIENT
Start: 2023-06-27 | End: 2023-06-27 | Stop reason: SDUPTHER

## 2023-06-27 RX ORDER — AMLODIPINE BESYLATE 10 MG/1
10 TABLET ORAL NIGHTLY
Status: DISCONTINUED | OUTPATIENT
Start: 2023-06-27 | End: 2023-06-28 | Stop reason: HOSPADM

## 2023-06-27 RX ORDER — SODIUM CHLORIDE 9 MG/ML
INJECTION, SOLUTION INTRAVENOUS CONTINUOUS
Status: DISCONTINUED | OUTPATIENT
Start: 2023-06-27 | End: 2023-06-27

## 2023-06-27 RX ORDER — SODIUM CHLORIDE 9 MG/ML
INJECTION, SOLUTION INTRAVENOUS CONTINUOUS PRN
Status: DISCONTINUED | OUTPATIENT
Start: 2023-06-27 | End: 2023-06-27 | Stop reason: SDUPTHER

## 2023-06-27 RX ORDER — SODIUM CHLORIDE 9 MG/ML
INJECTION, SOLUTION INTRAVENOUS PRN
Status: DISCONTINUED | OUTPATIENT
Start: 2023-06-27 | End: 2023-06-28 | Stop reason: HOSPADM

## 2023-06-27 RX ORDER — SODIUM CHLORIDE 0.9 % (FLUSH) 0.9 %
5-40 SYRINGE (ML) INJECTION EVERY 12 HOURS SCHEDULED
Status: DISCONTINUED | OUTPATIENT
Start: 2023-06-27 | End: 2023-06-28 | Stop reason: HOSPADM

## 2023-06-27 RX ORDER — ALBUTEROL SULFATE 2.5 MG/3ML
2.5 SOLUTION RESPIRATORY (INHALATION) EVERY 6 HOURS PRN
Status: DISCONTINUED | OUTPATIENT
Start: 2023-06-27 | End: 2023-06-28 | Stop reason: HOSPADM

## 2023-06-27 RX ORDER — LISINOPRIL 20 MG/1
40 TABLET ORAL DAILY
Status: DISCONTINUED | OUTPATIENT
Start: 2023-06-28 | End: 2023-06-28 | Stop reason: HOSPADM

## 2023-06-27 RX ORDER — PROPOFOL 10 MG/ML
INJECTION, EMULSION INTRAVENOUS PRN
Status: DISCONTINUED | OUTPATIENT
Start: 2023-06-27 | End: 2023-06-27 | Stop reason: SDUPTHER

## 2023-06-27 RX ORDER — ONDANSETRON 2 MG/ML
4 INJECTION INTRAMUSCULAR; INTRAVENOUS EVERY 6 HOURS PRN
Status: DISCONTINUED | OUTPATIENT
Start: 2023-06-27 | End: 2023-06-28 | Stop reason: HOSPADM

## 2023-06-27 RX ORDER — ATORVASTATIN CALCIUM 20 MG/1
20 TABLET, FILM COATED ORAL NIGHTLY
Status: DISCONTINUED | OUTPATIENT
Start: 2023-06-27 | End: 2023-06-28 | Stop reason: HOSPADM

## 2023-06-27 RX ORDER — SODIUM CHLORIDE 0.9 % (FLUSH) 0.9 %
5-40 SYRINGE (ML) INJECTION PRN
Status: DISCONTINUED | OUTPATIENT
Start: 2023-06-27 | End: 2023-06-28 | Stop reason: HOSPADM

## 2023-06-27 RX ORDER — GABAPENTIN 600 MG/1
600 TABLET ORAL DAILY
Status: DISCONTINUED | OUTPATIENT
Start: 2023-06-28 | End: 2023-06-28 | Stop reason: HOSPADM

## 2023-06-27 RX ORDER — DIPHENHYDRAMINE HYDROCHLORIDE 50 MG/ML
INJECTION INTRAMUSCULAR; INTRAVENOUS PRN
Status: DISCONTINUED | OUTPATIENT
Start: 2023-06-27 | End: 2023-06-27 | Stop reason: SDUPTHER

## 2023-06-27 RX ORDER — MIDAZOLAM HYDROCHLORIDE 1 MG/ML
INJECTION INTRAMUSCULAR; INTRAVENOUS PRN
Status: DISCONTINUED | OUTPATIENT
Start: 2023-06-27 | End: 2023-06-27 | Stop reason: SDUPTHER

## 2023-06-27 RX ORDER — ALBUTEROL SULFATE 90 UG/1
2 AEROSOL, METERED RESPIRATORY (INHALATION) EVERY 6 HOURS PRN
Status: DISCONTINUED | OUTPATIENT
Start: 2023-06-27 | End: 2023-06-27 | Stop reason: CLARIF

## 2023-06-27 RX ORDER — CEFAZOLIN SODIUM 1 G/3ML
INJECTION, POWDER, FOR SOLUTION INTRAMUSCULAR; INTRAVENOUS PRN
Status: DISCONTINUED | OUTPATIENT
Start: 2023-06-27 | End: 2023-06-27 | Stop reason: SDUPTHER

## 2023-06-27 RX ORDER — FENTANYL CITRATE 50 UG/ML
INJECTION, SOLUTION INTRAMUSCULAR; INTRAVENOUS PRN
Status: DISCONTINUED | OUTPATIENT
Start: 2023-06-27 | End: 2023-06-27 | Stop reason: SDUPTHER

## 2023-06-27 RX ORDER — HYDROCHLOROTHIAZIDE 25 MG/1
25 TABLET ORAL DAILY
Status: DISCONTINUED | OUTPATIENT
Start: 2023-06-28 | End: 2023-06-28 | Stop reason: HOSPADM

## 2023-06-27 RX ADMIN — SODIUM CHLORIDE, PRESERVATIVE FREE 10 ML: 5 INJECTION INTRAVENOUS at 21:45

## 2023-06-27 RX ADMIN — PROPOFOL 20 MG: 10 INJECTION, EMULSION INTRAVENOUS at 11:23

## 2023-06-27 RX ADMIN — Medication 10 MG: at 11:47

## 2023-06-27 RX ADMIN — FENTANYL CITRATE 25 MCG: 0.05 INJECTION, SOLUTION INTRAMUSCULAR; INTRAVENOUS at 11:02

## 2023-06-27 RX ADMIN — MIDAZOLAM 1 MG: 1 INJECTION INTRAMUSCULAR; INTRAVENOUS at 11:02

## 2023-06-27 RX ADMIN — WATER 1000 MG: 1 INJECTION INTRAMUSCULAR; INTRAVENOUS; SUBCUTANEOUS at 21:44

## 2023-06-27 RX ADMIN — VANCOMYCIN HYDROCHLORIDE 1000 MG: 1 INJECTION, POWDER, LYOPHILIZED, FOR SOLUTION INTRAVENOUS at 11:35

## 2023-06-27 RX ADMIN — PROPOFOL 20 MCG/KG/MIN: 10 INJECTION, EMULSION INTRAVENOUS at 11:21

## 2023-06-27 RX ADMIN — ACETAMINOPHEN 650 MG: 325 TABLET ORAL at 17:31

## 2023-06-27 RX ADMIN — SODIUM CHLORIDE: 9 INJECTION, SOLUTION INTRAVENOUS at 10:48

## 2023-06-27 RX ADMIN — CEFAZOLIN 2 G: 1 INJECTION, POWDER, FOR SOLUTION INTRAMUSCULAR; INTRAVENOUS at 11:10

## 2023-06-27 RX ADMIN — PROPOFOL 40 MG: 10 INJECTION, EMULSION INTRAVENOUS at 11:12

## 2023-06-27 RX ADMIN — ATORVASTATIN CALCIUM 20 MG: 20 TABLET, FILM COATED ORAL at 21:44

## 2023-06-27 RX ADMIN — DIPHENHYDRAMINE HYDROCHLORIDE 50 MG: 50 INJECTION, SOLUTION INTRAMUSCULAR; INTRAVENOUS at 11:10

## 2023-06-27 RX ADMIN — AMLODIPINE BESYLATE 10 MG: 10 TABLET ORAL at 17:52

## 2023-06-27 RX ADMIN — FENTANYL CITRATE 25 MCG: 0.05 INJECTION, SOLUTION INTRAMUSCULAR; INTRAVENOUS at 11:23

## 2023-06-27 RX ADMIN — Medication 20 MG: at 11:50

## 2023-06-27 RX ADMIN — SODIUM CHLORIDE: 9 INJECTION, SOLUTION INTRAVENOUS at 11:35

## 2023-06-27 ASSESSMENT — PAIN SCALES - GENERAL
PAINLEVEL_OUTOF10: 0
PAINLEVEL_OUTOF10: 6

## 2023-06-27 ASSESSMENT — LIFESTYLE VARIABLES
HOW OFTEN DO YOU HAVE A DRINK CONTAINING ALCOHOL: NEVER
SMOKING_STATUS: 1
HOW MANY STANDARD DRINKS CONTAINING ALCOHOL DO YOU HAVE ON A TYPICAL DAY: PATIENT DOES NOT DRINK

## 2023-06-28 ENCOUNTER — APPOINTMENT (OUTPATIENT)
Dept: GENERAL RADIOLOGY | Age: 73
End: 2023-06-28
Attending: INTERNAL MEDICINE
Payer: MEDICARE

## 2023-06-28 VITALS
RESPIRATION RATE: 20 BRPM | HEART RATE: 62 BPM | TEMPERATURE: 97.5 F | WEIGHT: 175 LBS | SYSTOLIC BLOOD PRESSURE: 150 MMHG | HEIGHT: 73 IN | DIASTOLIC BLOOD PRESSURE: 80 MMHG | OXYGEN SATURATION: 99 % | BODY MASS INDEX: 23.19 KG/M2

## 2023-06-28 LAB
ANION GAP SERPL CALCULATED.3IONS-SCNC: 12 MMOL/L (ref 7–16)
BUN SERPL-MCNC: 18 MG/DL (ref 6–23)
CALCIUM SERPL-MCNC: 9 MG/DL (ref 8.6–10.2)
CHLORIDE SERPL-SCNC: 106 MMOL/L (ref 98–107)
CO2 SERPL-SCNC: 21 MMOL/L (ref 22–29)
CREAT SERPL-MCNC: 1.1 MG/DL (ref 0.7–1.2)
EKG ATRIAL RATE: 63 BPM
EKG P AXIS: 56 DEGREES
EKG P-R INTERVAL: 242 MS
EKG Q-T INTERVAL: 504 MS
EKG QRS DURATION: 188 MS
EKG QTC CALCULATION (BAZETT): 515 MS
EKG R AXIS: 99 DEGREES
EKG T AXIS: -26 DEGREES
EKG VENTRICULAR RATE: 63 BPM
ERYTHROCYTE [DISTWIDTH] IN BLOOD BY AUTOMATED COUNT: 14.4 FL (ref 11.5–15)
GLUCOSE SERPL-MCNC: 96 MG/DL (ref 74–99)
HCT VFR BLD AUTO: 45.1 % (ref 37–54)
HGB BLD-MCNC: 15.1 G/DL (ref 12.5–16.5)
MCH RBC QN AUTO: 29.2 PG (ref 26–35)
MCHC RBC AUTO-ENTMCNC: 33.5 % (ref 32–34.5)
MCV RBC AUTO: 87.2 FL (ref 80–99.9)
PLATELET # BLD AUTO: 201 E9/L (ref 130–450)
PMV BLD AUTO: 10.6 FL (ref 7–12)
POTASSIUM SERPL-SCNC: 4.3 MMOL/L (ref 3.5–5)
RBC # BLD AUTO: 5.17 E12/L (ref 3.8–5.8)
SODIUM SERPL-SCNC: 139 MMOL/L (ref 132–146)
WBC # BLD: 10.6 E9/L (ref 4.5–11.5)

## 2023-06-28 PROCEDURE — G0378 HOSPITAL OBSERVATION PER HR: HCPCS

## 2023-06-28 PROCEDURE — 36415 COLL VENOUS BLD VENIPUNCTURE: CPT

## 2023-06-28 PROCEDURE — 6360000002 HC RX W HCPCS: Performed by: INTERNAL MEDICINE

## 2023-06-28 PROCEDURE — 85027 COMPLETE CBC AUTOMATED: CPT

## 2023-06-28 PROCEDURE — 6370000000 HC RX 637 (ALT 250 FOR IP): Performed by: STUDENT IN AN ORGANIZED HEALTH CARE EDUCATION/TRAINING PROGRAM

## 2023-06-28 PROCEDURE — 2580000003 HC RX 258: Performed by: INTERNAL MEDICINE

## 2023-06-28 PROCEDURE — 99238 HOSP IP/OBS DSCHRG MGMT 30/<: CPT | Performed by: INTERNAL MEDICINE

## 2023-06-28 PROCEDURE — 80048 BASIC METABOLIC PNL TOTAL CA: CPT

## 2023-06-28 PROCEDURE — 6370000000 HC RX 637 (ALT 250 FOR IP): Performed by: INTERNAL MEDICINE

## 2023-06-28 PROCEDURE — 71046 X-RAY EXAM CHEST 2 VIEWS: CPT

## 2023-06-28 RX ORDER — DOXYCYCLINE HYCLATE 100 MG
100 TABLET ORAL 2 TIMES DAILY
Qty: 14 TABLET | Refills: 0 | Status: SHIPPED | OUTPATIENT
Start: 2023-06-28 | End: 2023-07-05

## 2023-06-28 RX ADMIN — WATER 1000 MG: 1 INJECTION INTRAMUSCULAR; INTRAVENOUS; SUBCUTANEOUS at 11:11

## 2023-06-28 RX ADMIN — GABAPENTIN 600 MG: 600 TABLET, FILM COATED ORAL at 08:27

## 2023-06-28 RX ADMIN — SODIUM CHLORIDE, PRESERVATIVE FREE 10 ML: 5 INJECTION INTRAVENOUS at 08:28

## 2023-06-28 RX ADMIN — ACETAMINOPHEN 650 MG: 325 TABLET ORAL at 08:33

## 2023-06-28 RX ADMIN — LISINOPRIL 40 MG: 20 TABLET ORAL at 08:27

## 2023-06-28 RX ADMIN — HYDROCHLOROTHIAZIDE 25 MG: 25 TABLET ORAL at 08:28

## 2023-06-28 RX ADMIN — WATER 1000 MG: 1 INJECTION INTRAMUSCULAR; INTRAVENOUS; SUBCUTANEOUS at 04:00

## 2023-06-28 ASSESSMENT — PAIN DESCRIPTION - ORIENTATION: ORIENTATION: LEFT

## 2023-06-28 ASSESSMENT — PAIN SCALES - GENERAL
PAINLEVEL_OUTOF10: 4
PAINLEVEL_OUTOF10: 2

## 2023-06-28 ASSESSMENT — PAIN DESCRIPTION - LOCATION: LOCATION: CHEST;SHOULDER

## 2023-06-28 ASSESSMENT — PAIN - FUNCTIONAL ASSESSMENT: PAIN_FUNCTIONAL_ASSESSMENT: ACTIVITIES ARE NOT PREVENTED

## 2023-06-28 ASSESSMENT — PAIN DESCRIPTION - DESCRIPTORS: DESCRIPTORS: ACHING;DISCOMFORT

## 2023-07-03 ENCOUNTER — TELEPHONE (OUTPATIENT)
Dept: CARDIAC CATH/INVASIVE PROCEDURES | Age: 73
End: 2023-07-03

## 2023-07-03 NOTE — TELEPHONE ENCOUNTER
I called Mr. Radha Chi to see how he's doing since his PPM insertion on 6/26/23. He states he's doing fine & that the aquacel dressing remains intact. He denies any fevers, redness, bleeding, drainage, or swelling from PPM incision site. I reminded him to remove the aquacel dsg tomorrow ( He is going to his pcp on 7/5 and will have him remove it) , not to touch the steri-strips, & to continue to wear his sling at night for 4 weeks; along with not abducting the L arm above the shoulder. He's also aware of his follow up appt at the [address] [place] [address] on 7/11/23 at 11:00 am.  He offers no complaints & is thankful for the phone call.

## 2023-07-06 ENCOUNTER — NURSE ONLY (OUTPATIENT)
Dept: NON INVASIVE DIAGNOSTICS | Age: 73
End: 2023-07-06

## 2023-07-10 LAB
EKG ATRIAL RATE: 63 BPM
EKG P AXIS: 56 DEGREES
EKG P-R INTERVAL: 242 MS
EKG Q-T INTERVAL: 504 MS
EKG QRS DURATION: 188 MS
EKG QTC CALCULATION (BAZETT): 515 MS
EKG R AXIS: 99 DEGREES
EKG T AXIS: -26 DEGREES
EKG VENTRICULAR RATE: 63 BPM

## 2023-09-12 ENCOUNTER — NURSE ONLY (OUTPATIENT)
Dept: NON INVASIVE DIAGNOSTICS | Age: 73
End: 2023-09-12
Payer: MEDICARE

## 2023-09-12 DIAGNOSIS — I44.2 CHB (COMPLETE HEART BLOCK) (HCC): ICD-10-CM

## 2023-09-12 DIAGNOSIS — Z95.0 S/P PLACEMENT OF CARDIAC PACEMAKER: Primary | ICD-10-CM

## 2023-09-12 PROCEDURE — 93280 PM DEVICE PROGR EVAL DUAL: CPT | Performed by: INTERNAL MEDICINE

## 2023-09-14 PROBLEM — Z23 NEED FOR INFLUENZA VACCINATION: Status: ACTIVE | Noted: 2023-09-14

## 2023-09-14 PROBLEM — Z12.11 COLON CANCER SCREENING: Status: ACTIVE | Noted: 2023-09-14

## 2023-09-14 PROBLEM — Z23 NEED FOR PNEUMOCOCCAL VACCINATION: Status: ACTIVE | Noted: 2023-09-14

## 2023-10-14 PROBLEM — Z23 NEED FOR INFLUENZA VACCINATION: Status: RESOLVED | Noted: 2023-09-14 | Resolved: 2023-10-14

## 2023-10-14 PROBLEM — Z12.11 COLON CANCER SCREENING: Status: RESOLVED | Noted: 2023-09-14 | Resolved: 2023-10-14

## 2024-01-05 ENCOUNTER — OFFICE VISIT (OUTPATIENT)
Dept: FAMILY MEDICINE CLINIC | Age: 74
End: 2024-01-05

## 2024-01-05 VITALS
HEART RATE: 62 BPM | SYSTOLIC BLOOD PRESSURE: 132 MMHG | DIASTOLIC BLOOD PRESSURE: 78 MMHG | OXYGEN SATURATION: 97 % | BODY MASS INDEX: 23.63 KG/M2 | WEIGHT: 184.1 LBS | TEMPERATURE: 97.3 F | RESPIRATION RATE: 18 BRPM | HEIGHT: 74 IN

## 2024-01-05 DIAGNOSIS — J44.9 STAGE 2 MODERATE COPD BY GOLD CLASSIFICATION (HCC): Chronic | ICD-10-CM

## 2024-01-05 DIAGNOSIS — E78.5 DYSLIPIDEMIA: ICD-10-CM

## 2024-01-05 DIAGNOSIS — E55.9 VITAMIN D DEFICIENCY: ICD-10-CM

## 2024-01-05 DIAGNOSIS — F39 MOOD DISORDER (HCC): ICD-10-CM

## 2024-01-05 DIAGNOSIS — Z12.5 PROSTATE CANCER SCREENING: ICD-10-CM

## 2024-01-05 DIAGNOSIS — N52.9 ERECTILE DYSFUNCTION, UNSPECIFIED ERECTILE DYSFUNCTION TYPE: ICD-10-CM

## 2024-01-05 DIAGNOSIS — I44.2 THIRD DEGREE HEART BLOCK (HCC): ICD-10-CM

## 2024-01-05 DIAGNOSIS — I10 ESSENTIAL HYPERTENSION: Primary | ICD-10-CM

## 2024-01-05 RX ORDER — LISINOPRIL 40 MG/1
40 TABLET ORAL DAILY
Qty: 90 TABLET | Refills: 1 | Status: SHIPPED | OUTPATIENT
Start: 2024-01-05

## 2024-01-05 RX ORDER — GABAPENTIN 600 MG/1
600 TABLET ORAL 3 TIMES DAILY
Qty: 270 TABLET | Refills: 1 | Status: SHIPPED | OUTPATIENT
Start: 2024-01-05 | End: 2024-07-03

## 2024-01-05 RX ORDER — SILDENAFIL 50 MG/1
50 TABLET, FILM COATED ORAL DAILY PRN
Qty: 90 TABLET | Refills: 0 | Status: SHIPPED | OUTPATIENT
Start: 2024-01-05

## 2024-01-05 RX ORDER — SPIRONOLACTONE 25 MG/1
25 TABLET ORAL DAILY
Qty: 90 TABLET | Refills: 1 | Status: SHIPPED | OUTPATIENT
Start: 2024-01-05

## 2024-01-05 RX ORDER — HYDROCHLOROTHIAZIDE 25 MG/1
25 TABLET ORAL DAILY
Qty: 90 TABLET | Refills: 1 | Status: SHIPPED | OUTPATIENT
Start: 2024-01-05

## 2024-01-05 RX ORDER — ATORVASTATIN CALCIUM 20 MG/1
20 TABLET, FILM COATED ORAL NIGHTLY
Qty: 90 TABLET | Refills: 1 | Status: SHIPPED | OUTPATIENT
Start: 2024-01-05

## 2024-01-05 RX ORDER — AMLODIPINE BESYLATE 10 MG/1
10 TABLET ORAL DAILY
Qty: 90 TABLET | Refills: 1 | Status: SHIPPED | OUTPATIENT
Start: 2024-01-05

## 2024-01-05 ASSESSMENT — ENCOUNTER SYMPTOMS
BLOOD IN STOOL: 0
SHORTNESS OF BREATH: 0
CHEST TIGHTNESS: 0

## 2024-01-05 ASSESSMENT — PATIENT HEALTH QUESTIONNAIRE - PHQ9
SUM OF ALL RESPONSES TO PHQ QUESTIONS 1-9: 0
SUM OF ALL RESPONSES TO PHQ9 QUESTIONS 1 & 2: 0
SUM OF ALL RESPONSES TO PHQ QUESTIONS 1-9: 0
1. LITTLE INTEREST OR PLEASURE IN DOING THINGS: 0
2. FEELING DOWN, DEPRESSED OR HOPELESS: 0
SUM OF ALL RESPONSES TO PHQ QUESTIONS 1-9: 0
SUM OF ALL RESPONSES TO PHQ QUESTIONS 1-9: 0

## 2024-01-05 ASSESSMENT — LIFESTYLE VARIABLES
HOW MANY STANDARD DRINKS CONTAINING ALCOHOL DO YOU HAVE ON A TYPICAL DAY: PATIENT DOES NOT DRINK
HOW OFTEN DO YOU HAVE A DRINK CONTAINING ALCOHOL: NEVER

## 2024-01-05 NOTE — PROGRESS NOTES
PSA Screening; Future    Labs ordered last visit to be done w/in the next two weeks and will contact by phone if needed.     Labs ordered today are to be done one week prior to next visit.     Return in about 6 months (around 7/5/2024) for follow-up, or sooner if needed, labs 1 wk prior.     Byron Peace, DO  01/05/24  2:58 PM    There are no Patient Instructions on file for this visit.

## 2024-02-04 PROBLEM — Z12.5 PROSTATE CANCER SCREENING: Status: RESOLVED | Noted: 2024-01-05 | Resolved: 2024-02-04

## 2024-04-03 NOTE — PROGRESS NOTES
Carlos A Stuart   Patient is a 80y.o. year old male who presents with:  Chief Complaint   Patient presents with   [address] [place] [address]     labs done 6/28/18      Patient also follows with: [facility]     HPI    HTN: Current treatment: Lisinopril 40 mg daily, hydrochlorothiazide 12.5 mg daily. Recent changes in medication: Hydrochlorothiazide was started last visit. Reports blood pressure has recently been well-controlled. Denies lightheadedness or other problems associated with the medication. The patient is not known to have history of CAD. Last three blood pressure readings below. BP Readings from Last 3 Encounters:   07/03/18 (!) 144/84   05/23/18 (!) 176/98   05/15/18 (!) 145/82       Dyslipidemia: Current treatment: Atorvastatin 20 mg nightly. Recent changes in medications: Atorvastatin was started 1/5/18, increased from 10 mg nightly 4/5/18. Patient denies problems associated with medication. Relevant factors include: Olanzapine. Results of most recent lipid panel below. Lab Results   Component Value Date    CHOL 189 06/28/2018    HDL 27 06/28/2018    LDLCALC 112 (H) 06/28/2018    TRIG 248 (H) 06/28/2018     COPD: Current treatment: albuterol when necessary. Recent changes in medications: none. Current symptoms include: chronic dyspnea. Patient has recently needed to use the rescue inhaler about once daily on average. Tobacco use: smokes 1/2 ppd - states he may consider a tobacco cessation program in the near future at [address] [place] [address]. Review of Systems   Constitutional: Positive for fatigue. Negative for unexpected weight change. Eyes: Negative for visual disturbance. Respiratory: Positive for shortness of breath. Negative for cough. Cardiovascular: Negative for chest pain and leg swelling. Gastrointestinal: Negative for abdominal pain and blood in stool. Musculoskeletal: Negative for arthralgias. Neurological: Negative for weakness, numbness and headaches. Health Maintenance Due   Topic Date Due    AAA screen  1950    DTaP/Tdap/Td vaccine (1 - Tdap) 06/29/1970    Shingles Vaccine (1 of 2 - 2 Dose Series) 06/29/2001    Pneumococcal low/med risk (1 of 2 - PCV13) 06/29/2016     Pneumococcal vaccine: Declines    AAA: declines    OBJECTIVE    BP (!) 144/84   Pulse 70   Wt 177 lb (80.3 kg)   SpO2 98%   BMI 22.73 kg/m²     Physical Exam   Constitutional: He is oriented to person, place, and time. No distress. HENT:   Head: Normocephalic and atraumatic. Right Ear: Hearing and external ear normal.   Left Ear: Hearing and external ear normal.   Eyes: Conjunctivae are normal.   Neck: Neck supple. No thyromegaly present. Cardiovascular: Normal rate, regular rhythm, normal heart sounds and intact distal pulses. Pulmonary/Chest: Effort normal and breath sounds normal.   Abdominal: Soft. Bowel sounds are normal. There is no tenderness. Musculoskeletal: He exhibits no edema. Neurological: He is alert and oriented to person, place, and time. No cranial nerve deficit. Skin: Skin is warm and dry. He is not diaphoretic. Psychiatric: He has a normal mood and affect. His speech is normal and behavior is normal.   Restricted affect   Osteopathic: Normal kyphotic and lordotic curves. No acute somatic dysfunction of the cervical, thoracic, or lumbar spine. ASSESSMENT AND PLAN    1. Essential hypertension  Controlled, continue current treatment. - CBC Auto Differential; Future  - Comprehensive Metabolic Panel; Future    2. Dyslipidemia  Improved with regard to total cholesterol, triglycerides remain elevated/have worsened. Begin omega-3 fatty acids and continue atorvastatin 20 mg daily.  - Omega-3 Fatty Acids (FISH OIL OMEGA-3) 1000 MG CAPS; Take 2,000 mg by mouth daily  Dispense: 30 capsule; Refill: 0  - Comprehensive Metabolic Panel; Future  - Lipid Panel; Future    3.  Wheezing  Stable, continue current treatment  - albuterol sulfate HFA (VENTOLIN HFA) 108 (90 Base) MCG/ACT inhaler; Inhale 2 puffs into the lungs every 6 hours as needed for Wheezing  Dispense: 1 Inhaler; Refill: 3    4. Tobacco abuse  Patient states he may consider beginning a cessation assistance program in the near future. Patient declines prescription for cessation assistance products at this time. Patient has been councelled on the benefits of quitting and the risks of continued tobacco abuse including death and has demonstrated understanding.  - CBC Auto Differential; Future    Return in about 3 months (around 10/3/2018) for lab review. Call or go to ED immediately if symptoms worsen or persist.    Educational materials and/or home exercises printed for patient's review and were included in patient instructions on his/her After Visit Summary and given to patient at the end of visit.       Counseled regarding above diagnosis, including possible risks and complications, especially if left uncontrolled.     Counseled regarding the possible side effects, risks, benefits and alternatives to treatment; patient and/or guardian verbalizes understanding, agrees, feels comfortable with and wishes to proceed with above treatment plan.     Advised patient to call with any new medication issues, and read all Rx info from pharmacy to assure aware of all possible risks and side effects of medication before taking.     Reviewed age and gender appropriate health screening exams and vaccinations. Advised patient regarding importance of keeping up with recommended health maintenance and to schedule as soon as possible if overdue, as this is important in assessing for undiagnosed pathology, especially cancer, as well as protecting against potentially harmful/life threatening disease.       Bonnie Avila DO  07/03/18  6:03 PM patient

## 2024-06-17 DIAGNOSIS — J44.9 STAGE 2 MODERATE COPD BY GOLD CLASSIFICATION (HCC): Chronic | ICD-10-CM

## 2024-06-17 DIAGNOSIS — F39 MOOD DISORDER (HCC): ICD-10-CM

## 2024-06-17 DIAGNOSIS — E55.9 VITAMIN D DEFICIENCY: ICD-10-CM

## 2024-06-17 DIAGNOSIS — E78.5 DYSLIPIDEMIA: ICD-10-CM

## 2024-06-17 DIAGNOSIS — N52.9 ERECTILE DYSFUNCTION, UNSPECIFIED ERECTILE DYSFUNCTION TYPE: ICD-10-CM

## 2024-06-17 DIAGNOSIS — Z12.5 PROSTATE CANCER SCREENING: ICD-10-CM

## 2024-06-17 DIAGNOSIS — I44.2 THIRD DEGREE HEART BLOCK (HCC): ICD-10-CM

## 2024-06-17 DIAGNOSIS — I10 ESSENTIAL HYPERTENSION: ICD-10-CM

## 2024-06-17 LAB
ALBUMIN: 4.3 G/DL (ref 3.5–5.2)
ALP BLD-CCNC: 103 U/L (ref 40–129)
ALT SERPL-CCNC: 25 U/L (ref 0–40)
ANION GAP SERPL CALCULATED.3IONS-SCNC: 11 MMOL/L (ref 7–16)
AST SERPL-CCNC: 18 U/L (ref 0–39)
BASOPHILS ABSOLUTE: 0.06 K/UL (ref 0–0.2)
BASOPHILS RELATIVE PERCENT: 1 % (ref 0–2)
BILIRUB SERPL-MCNC: 0.7 MG/DL (ref 0–1.2)
BUN BLDV-MCNC: 16 MG/DL (ref 6–23)
CALCIUM SERPL-MCNC: 9.3 MG/DL (ref 8.6–10.2)
CHLORIDE BLD-SCNC: 104 MMOL/L (ref 98–107)
CHOLESTEROL, TOTAL: 143 MG/DL
CO2: 26 MMOL/L (ref 22–29)
CREAT SERPL-MCNC: 1 MG/DL (ref 0.7–1.2)
EOSINOPHILS ABSOLUTE: 0.31 K/UL (ref 0.05–0.5)
EOSINOPHILS RELATIVE PERCENT: 4 % (ref 0–6)
GFR, ESTIMATED: 78 ML/MIN/1.73M2
GLUCOSE BLD-MCNC: 84 MG/DL (ref 74–99)
HBA1C MFR BLD: 5.7 % (ref 4–5.6)
HCT VFR BLD CALC: 49 % (ref 37–54)
HDLC SERPL-MCNC: 39 MG/DL
HEMOGLOBIN: 16.3 G/DL (ref 12.5–16.5)
IMMATURE GRANULOCYTES %: 0 % (ref 0–5)
IMMATURE GRANULOCYTES ABSOLUTE: 0.03 K/UL (ref 0–0.58)
LDL CHOLESTEROL: 91 MG/DL
LYMPHOCYTES ABSOLUTE: 2.53 K/UL (ref 1.5–4)
LYMPHOCYTES RELATIVE PERCENT: 30 % (ref 20–42)
MCH RBC QN AUTO: 29.5 PG (ref 26–35)
MCHC RBC AUTO-ENTMCNC: 33.3 G/DL (ref 32–34.5)
MCV RBC AUTO: 88.6 FL (ref 80–99.9)
MONOCYTES ABSOLUTE: 0.94 K/UL (ref 0.1–0.95)
MONOCYTES RELATIVE PERCENT: 11 % (ref 2–12)
NEUTROPHILS ABSOLUTE: 4.7 K/UL (ref 1.8–7.3)
NEUTROPHILS RELATIVE PERCENT: 55 % (ref 43–80)
PDW BLD-RTO: 13.8 % (ref 11.5–15)
PLATELET # BLD: 237 K/UL (ref 130–450)
PMV BLD AUTO: 11.1 FL (ref 7–12)
POTASSIUM SERPL-SCNC: 4 MMOL/L (ref 3.5–5)
PROSTATE SPECIFIC ANTIGEN: 5.12 NG/ML (ref 0–4)
RBC # BLD: 5.53 M/UL (ref 3.8–5.8)
SODIUM BLD-SCNC: 141 MMOL/L (ref 132–146)
T4 FREE: 1.3 NG/DL (ref 0.9–1.7)
TOTAL PROTEIN: 7.4 G/DL (ref 6.4–8.3)
TRIGL SERPL-MCNC: 66 MG/DL
TSH SERPL DL<=0.05 MIU/L-ACNC: 1.55 UIU/ML (ref 0.27–4.2)
VITAMIN D 25-HYDROXY: 37.7 NG/ML (ref 30–100)
VLDLC SERPL CALC-MCNC: 13 MG/DL
WBC # BLD: 8.6 K/UL (ref 4.5–11.5)

## 2024-07-05 ENCOUNTER — OFFICE VISIT (OUTPATIENT)
Dept: FAMILY MEDICINE CLINIC | Age: 74
End: 2024-07-05

## 2024-07-05 VITALS
HEIGHT: 73 IN | BODY MASS INDEX: 24.52 KG/M2 | SYSTOLIC BLOOD PRESSURE: 126 MMHG | TEMPERATURE: 97.5 F | WEIGHT: 185 LBS | DIASTOLIC BLOOD PRESSURE: 64 MMHG | OXYGEN SATURATION: 97 % | HEART RATE: 63 BPM

## 2024-07-05 DIAGNOSIS — E55.9 VITAMIN D DEFICIENCY: ICD-10-CM

## 2024-07-05 DIAGNOSIS — R73.03 PREDIABETES: ICD-10-CM

## 2024-07-05 DIAGNOSIS — I10 ESSENTIAL HYPERTENSION: ICD-10-CM

## 2024-07-05 DIAGNOSIS — J44.9 STAGE 2 MODERATE COPD BY GOLD CLASSIFICATION (HCC): Chronic | ICD-10-CM

## 2024-07-05 DIAGNOSIS — N52.9 ERECTILE DYSFUNCTION, UNSPECIFIED ERECTILE DYSFUNCTION TYPE: ICD-10-CM

## 2024-07-05 DIAGNOSIS — F39 MOOD DISORDER (HCC): ICD-10-CM

## 2024-07-05 DIAGNOSIS — Z12.11 ENCOUNTER FOR SCREENING FOR MALIGNANT NEOPLASM OF COLON: Primary | ICD-10-CM

## 2024-07-05 DIAGNOSIS — I44.2 THIRD DEGREE HEART BLOCK (HCC): ICD-10-CM

## 2024-07-05 DIAGNOSIS — E78.5 DYSLIPIDEMIA: ICD-10-CM

## 2024-07-05 DIAGNOSIS — R97.20 ELEVATED PSA: ICD-10-CM

## 2024-07-05 RX ORDER — AMLODIPINE BESYLATE 10 MG/1
10 TABLET ORAL DAILY
Qty: 90 TABLET | Refills: 1 | Status: SHIPPED | OUTPATIENT
Start: 2024-07-05

## 2024-07-05 RX ORDER — LISINOPRIL 40 MG/1
40 TABLET ORAL DAILY
Qty: 90 TABLET | Refills: 1 | Status: SHIPPED | OUTPATIENT
Start: 2024-07-05

## 2024-07-05 RX ORDER — SILDENAFIL 50 MG/1
50 TABLET, FILM COATED ORAL DAILY PRN
Qty: 90 TABLET | Refills: 0 | Status: SHIPPED | OUTPATIENT
Start: 2024-07-05

## 2024-07-05 RX ORDER — ATORVASTATIN CALCIUM 20 MG/1
20 TABLET, FILM COATED ORAL NIGHTLY
Qty: 90 TABLET | Refills: 1 | Status: SHIPPED | OUTPATIENT
Start: 2024-07-05

## 2024-07-05 RX ORDER — HYDROCHLOROTHIAZIDE 25 MG/1
25 TABLET ORAL DAILY
Qty: 90 TABLET | Refills: 1 | Status: SHIPPED | OUTPATIENT
Start: 2024-07-05

## 2024-07-05 RX ORDER — SPIRONOLACTONE 25 MG/1
25 TABLET ORAL DAILY
Qty: 90 TABLET | Refills: 1 | Status: SHIPPED | OUTPATIENT
Start: 2024-07-05

## 2024-07-05 SDOH — ECONOMIC STABILITY: FOOD INSECURITY: WITHIN THE PAST 12 MONTHS, THE FOOD YOU BOUGHT JUST DIDN'T LAST AND YOU DIDN'T HAVE MONEY TO GET MORE.: SOMETIMES TRUE

## 2024-07-05 SDOH — ECONOMIC STABILITY: INCOME INSECURITY: HOW HARD IS IT FOR YOU TO PAY FOR THE VERY BASICS LIKE FOOD, HOUSING, MEDICAL CARE, AND HEATING?: VERY HARD

## 2024-07-05 SDOH — ECONOMIC STABILITY: TRANSPORTATION INSECURITY
IN THE PAST 12 MONTHS, HAS LACK OF TRANSPORTATION KEPT YOU FROM MEETINGS, WORK, OR FROM GETTING THINGS NEEDED FOR DAILY LIVING?: NO

## 2024-07-05 SDOH — ECONOMIC STABILITY: FOOD INSECURITY: WITHIN THE PAST 12 MONTHS, YOU WORRIED THAT YOUR FOOD WOULD RUN OUT BEFORE YOU GOT MONEY TO BUY MORE.: OFTEN TRUE

## 2024-07-05 SDOH — ECONOMIC STABILITY: HOUSING INSECURITY
IN THE LAST 12 MONTHS, WAS THERE A TIME WHEN YOU DID NOT HAVE A STEADY PLACE TO SLEEP OR SLEPT IN A SHELTER (INCLUDING NOW)?: NO

## 2024-07-05 ASSESSMENT — ENCOUNTER SYMPTOMS
SHORTNESS OF BREATH: 0
CHEST TIGHTNESS: 0
BLOOD IN STOOL: 0

## 2024-07-05 NOTE — PATIENT INSTRUCTIONS
[employer]*  (Call [employer]/211 if need more resources.)         HELP NETWORK OF [facility]:  What they do: Provides 24-hr, 7 days a week access to information on community resources for financial help. [facility] AND [facility]  Phone: 211 or [phone]    [facility] FAMILY SERVICE: [address], [place] [address]  What they offer: Limited assistance to restore/ prevent utility disconnection.  Phone Number: [phone]  Website: [employer]    DEPARTMENT OF JOB AND FAMILY SERVICES:  MAIN [facility] LINE FOR ALL [facility]: [phone]  What they do: [place] works first with temporary cash assistance if there are children in household.   [facility] DJFS: [address] [place] [address]  Phone: [phone], [phone]  [facility] DJFS: [address] [place] [address]  Phone: [phone]  [facility] DJFS: [address] [place] [address]  Phone: [phone]  Website: [email or web address].[email or web address].[facility]    [employer] Financial Assistance  What they offer: Assistance with [employer] bills  Phone: [phone], option #5     Medications:  Good Rx  What they offer: Good [employer] tracks prescription drug prices and provides free drug coupons for discounts on medications.  Website: https://www.RewardIt.com    [employer]  What they offer: [employer] offers free information on medications and healthcare cost savings programs including prescription assistance programs, coupons, and discount programs.  Helpline: 492.495.9379  Website: https://www.SimilarSites.com.org    RX Assist  What they offer: Information about free and low-cost medicine programs.  Website: https://www.rxassist.org/    [employer] $4 Prescription Program  What they offer: Prescription Program includes up to a 30-day supply for $4 and a 90-day supply for $10 of some covered generic drugs at commonly prescribed dosages  Website: https://www.SoPost/cp/4-prescriptions/6332122    [employer]

## 2024-07-05 NOTE — PROGRESS NOTES
[facility]):  What they offer: Hot dinner and cold lunch Monday-Friday ($14/day); hot dinner Monday-Friday ($9/day)  Phone number: [phone]  [facility]:  What they offer: Delivers refrigerated meals to heat. Special diets. Private pay, government funded programs and some insurance plans.  Phone Number: [phone]  Website: [employer]        MOBILE MEALS [facility] ([facility]):  What they offer: Monday-Friday delivered hot and cold meal, $6.00/day(must live in [facility])  Phone Number: [phone]    [facility] PROGRAM:  What they offer: Delivers hot meals to homebound individuals living in the [place] and [facility]. Monday-Friday. If eligible and funds available, two frozen meals for Saturday and Sunday.  Phone Number: [phone]  [facility] SERVICES-MEALS:  What they offer: Home delivered to [place] and [facility] residents. Donation for meals.   Phone Number: [phone]                 [employer]   [phone]    What they offer:  Home delivered meals to [facility] residents with no age restriction; hot meals delivered Monday thru Friday and have frozen meals for weekend; cost is $8.60/meal for special diet and $8.35/meal for regular diet;   Offer sliding fee scale as well for discounted meals

## 2024-09-17 DIAGNOSIS — N52.9 ERECTILE DYSFUNCTION, UNSPECIFIED ERECTILE DYSFUNCTION TYPE: ICD-10-CM

## 2024-09-17 RX ORDER — SILDENAFIL 50 MG/1
TABLET, FILM COATED ORAL
Qty: 90 TABLET | Refills: 0 | Status: SHIPPED | OUTPATIENT
Start: 2024-09-17

## 2025-01-03 DIAGNOSIS — Z12.11 ENCOUNTER FOR SCREENING FOR MALIGNANT NEOPLASM OF COLON: ICD-10-CM

## 2025-01-03 DIAGNOSIS — R97.20 ELEVATED PSA: ICD-10-CM

## 2025-01-03 DIAGNOSIS — E78.5 DYSLIPIDEMIA: ICD-10-CM

## 2025-01-03 DIAGNOSIS — I10 ESSENTIAL HYPERTENSION: ICD-10-CM

## 2025-01-03 DIAGNOSIS — N52.9 ERECTILE DYSFUNCTION, UNSPECIFIED ERECTILE DYSFUNCTION TYPE: ICD-10-CM

## 2025-01-03 DIAGNOSIS — I44.2 THIRD DEGREE HEART BLOCK (HCC): ICD-10-CM

## 2025-01-03 DIAGNOSIS — F39 MOOD DISORDER (HCC): ICD-10-CM

## 2025-01-03 DIAGNOSIS — R73.03 PREDIABETES: ICD-10-CM

## 2025-01-03 DIAGNOSIS — J44.9 STAGE 2 MODERATE COPD BY GOLD CLASSIFICATION (HCC): Chronic | ICD-10-CM

## 2025-01-03 DIAGNOSIS — E55.9 VITAMIN D DEFICIENCY: ICD-10-CM

## 2025-01-03 LAB
ALBUMIN: 4.5 G/DL (ref 3.5–5.2)
ALP BLD-CCNC: 102 U/L (ref 40–129)
ALT SERPL-CCNC: 23 U/L (ref 0–40)
ANION GAP SERPL CALCULATED.3IONS-SCNC: 9 MMOL/L (ref 7–16)
AST SERPL-CCNC: 23 U/L (ref 0–39)
BASOPHILS ABSOLUTE: 0.05 K/UL (ref 0–0.2)
BASOPHILS RELATIVE PERCENT: 1 % (ref 0–2)
BILIRUB SERPL-MCNC: 0.4 MG/DL (ref 0–1.2)
BUN BLDV-MCNC: 18 MG/DL (ref 6–23)
CALCIUM SERPL-MCNC: 9.8 MG/DL (ref 8.6–10.2)
CHLORIDE BLD-SCNC: 105 MMOL/L (ref 98–107)
CHOLESTEROL, TOTAL: 157 MG/DL
CO2: 27 MMOL/L (ref 22–29)
CREAT SERPL-MCNC: 1 MG/DL (ref 0.7–1.2)
EOSINOPHILS ABSOLUTE: 0.38 K/UL (ref 0.05–0.5)
EOSINOPHILS RELATIVE PERCENT: 4 % (ref 0–6)
GFR, ESTIMATED: 76 ML/MIN/1.73M2
GLUCOSE BLD-MCNC: 91 MG/DL (ref 74–99)
HBA1C MFR BLD: 5.5 % (ref 4–5.6)
HCT VFR BLD CALC: 49.9 % (ref 37–54)
HDLC SERPL-MCNC: 28 MG/DL
HEMOGLOBIN: 16.1 G/DL (ref 12.5–16.5)
IMMATURE GRANULOCYTES %: 0 % (ref 0–5)
IMMATURE GRANULOCYTES ABSOLUTE: 0.04 K/UL (ref 0–0.58)
LDL CHOLESTEROL: 83 MG/DL
LYMPHOCYTES ABSOLUTE: 3.58 K/UL (ref 1.5–4)
LYMPHOCYTES RELATIVE PERCENT: 34 % (ref 20–42)
MCH RBC QN AUTO: 29.1 PG (ref 26–35)
MCHC RBC AUTO-ENTMCNC: 32.3 G/DL (ref 32–34.5)
MCV RBC AUTO: 90.2 FL (ref 80–99.9)
MONOCYTES ABSOLUTE: 1.29 K/UL (ref 0.1–0.95)
MONOCYTES RELATIVE PERCENT: 12 % (ref 2–12)
NEUTROPHILS ABSOLUTE: 5.18 K/UL (ref 1.8–7.3)
NEUTROPHILS RELATIVE PERCENT: 49 % (ref 43–80)
PDW BLD-RTO: 14.3 % (ref 11.5–15)
PLATELET # BLD: 237 K/UL (ref 130–450)
PMV BLD AUTO: 11.7 FL (ref 7–12)
POTASSIUM SERPL-SCNC: 5.1 MMOL/L (ref 3.5–5)
PROSTATE SPECIFIC ANTIGEN: 7.75 NG/ML (ref 0–4)
RBC # BLD: 5.53 M/UL (ref 3.8–5.8)
SODIUM BLD-SCNC: 141 MMOL/L (ref 132–146)
T4 FREE: 1.2 NG/DL (ref 0.9–1.7)
TOTAL PROTEIN: 7.5 G/DL (ref 6.4–8.3)
TRIGL SERPL-MCNC: 228 MG/DL
TSH SERPL DL<=0.05 MIU/L-ACNC: 2.15 UIU/ML (ref 0.27–4.2)
VITAMIN D 25-HYDROXY: 27.6 NG/ML (ref 30–100)
VLDLC SERPL CALC-MCNC: 46 MG/DL
WBC # BLD: 10.5 K/UL (ref 4.5–11.5)

## 2025-01-04 ASSESSMENT — PATIENT HEALTH QUESTIONNAIRE - PHQ9
SUM OF ALL RESPONSES TO PHQ QUESTIONS 1-9: 2
SUM OF ALL RESPONSES TO PHQ9 QUESTIONS 1 & 2: 2
SUM OF ALL RESPONSES TO PHQ QUESTIONS 1-9: 2
SUM OF ALL RESPONSES TO PHQ QUESTIONS 1-9: 2
2. FEELING DOWN, DEPRESSED OR HOPELESS: SEVERAL DAYS
1. LITTLE INTEREST OR PLEASURE IN DOING THINGS: SEVERAL DAYS
SUM OF ALL RESPONSES TO PHQ QUESTIONS 1-9: 2
1. LITTLE INTEREST OR PLEASURE IN DOING THINGS: SEVERAL DAYS
SUM OF ALL RESPONSES TO PHQ9 QUESTIONS 1 & 2: 2
2. FEELING DOWN, DEPRESSED OR HOPELESS: SEVERAL DAYS

## 2025-01-07 ENCOUNTER — OFFICE VISIT (OUTPATIENT)
Dept: FAMILY MEDICINE CLINIC | Age: 75
End: 2025-01-07
Payer: MEDICARE

## 2025-01-07 VITALS
RESPIRATION RATE: 18 BRPM | WEIGHT: 182.6 LBS | DIASTOLIC BLOOD PRESSURE: 72 MMHG | TEMPERATURE: 97.3 F | BODY MASS INDEX: 24.2 KG/M2 | SYSTOLIC BLOOD PRESSURE: 130 MMHG | HEART RATE: 69 BPM | OXYGEN SATURATION: 97 % | HEIGHT: 73 IN

## 2025-01-07 DIAGNOSIS — Z12.5 PROSTATE CANCER SCREENING: ICD-10-CM

## 2025-01-07 DIAGNOSIS — I10 ESSENTIAL HYPERTENSION: ICD-10-CM

## 2025-01-07 DIAGNOSIS — E55.9 VITAMIN D DEFICIENCY: ICD-10-CM

## 2025-01-07 DIAGNOSIS — J44.9 STAGE 2 MODERATE COPD BY GOLD CLASSIFICATION (HCC): ICD-10-CM

## 2025-01-07 DIAGNOSIS — I44.2 THIRD DEGREE HEART BLOCK (HCC): Primary | ICD-10-CM

## 2025-01-07 DIAGNOSIS — E78.5 DYSLIPIDEMIA: ICD-10-CM

## 2025-01-07 DIAGNOSIS — N52.9 ERECTILE DYSFUNCTION, UNSPECIFIED ERECTILE DYSFUNCTION TYPE: ICD-10-CM

## 2025-01-07 DIAGNOSIS — R97.20 ELEVATED PSA: ICD-10-CM

## 2025-01-07 DIAGNOSIS — R73.03 PREDIABETES: ICD-10-CM

## 2025-01-07 DIAGNOSIS — F39 MOOD DISORDER (HCC): ICD-10-CM

## 2025-01-07 PROCEDURE — 99214 OFFICE O/P EST MOD 30 MIN: CPT | Performed by: FAMILY MEDICINE

## 2025-01-07 PROCEDURE — 1159F MED LIST DOCD IN RCRD: CPT | Performed by: FAMILY MEDICINE

## 2025-01-07 PROCEDURE — 1160F RVW MEDS BY RX/DR IN RCRD: CPT | Performed by: FAMILY MEDICINE

## 2025-01-07 PROCEDURE — 3075F SYST BP GE 130 - 139MM HG: CPT | Performed by: FAMILY MEDICINE

## 2025-01-07 PROCEDURE — 3078F DIAST BP <80 MM HG: CPT | Performed by: FAMILY MEDICINE

## 2025-01-07 PROCEDURE — 1124F ACP DISCUSS-NO DSCNMKR DOCD: CPT | Performed by: FAMILY MEDICINE

## 2025-01-07 RX ORDER — LISINOPRIL 40 MG/1
40 TABLET ORAL DAILY
Qty: 90 TABLET | Refills: 1 | Status: SHIPPED | OUTPATIENT
Start: 2025-01-07

## 2025-01-07 RX ORDER — AMLODIPINE BESYLATE 10 MG/1
10 TABLET ORAL DAILY
Qty: 90 TABLET | Refills: 1 | Status: SHIPPED | OUTPATIENT
Start: 2025-01-07

## 2025-01-07 RX ORDER — HYDROCHLOROTHIAZIDE 25 MG/1
25 TABLET ORAL DAILY
Qty: 90 TABLET | Refills: 1 | Status: SHIPPED | OUTPATIENT
Start: 2025-01-07

## 2025-01-07 RX ORDER — SPIRONOLACTONE 25 MG/1
25 TABLET ORAL DAILY
Qty: 90 TABLET | Refills: 1 | Status: SHIPPED | OUTPATIENT
Start: 2025-01-07

## 2025-01-07 RX ORDER — SILDENAFIL 50 MG/1
50 TABLET, FILM COATED ORAL DAILY PRN
Qty: 90 TABLET | Refills: 0 | Status: SHIPPED | OUTPATIENT
Start: 2025-01-07

## 2025-01-07 RX ORDER — GABAPENTIN 600 MG/1
600 TABLET ORAL 3 TIMES DAILY
Qty: 270 TABLET | Refills: 1 | Status: SHIPPED | OUTPATIENT
Start: 2025-01-07 | End: 2025-07-06

## 2025-01-07 RX ORDER — ATORVASTATIN CALCIUM 20 MG/1
20 TABLET, FILM COATED ORAL NIGHTLY
Qty: 90 TABLET | Refills: 1 | Status: SHIPPED | OUTPATIENT
Start: 2025-01-07

## 2025-01-07 NOTE — PROGRESS NOTES
Jose Cobos   Patient is a 74 y.o. year old male who presents with:  Chief Complaint   Patient presents with    6 Month Follow-Up    Discuss Labs     Labs done 1/3/2025    Medication Refill     Needs refill of Viagra and needs renewal of handicap placard        HPI    History of Present Illness  The patient is a 74-year-old male who presents for a 6-month follow-up.    He has a history of heart block, which is currently stable. His pacemaker was recently tested remotely and found to be functioning optimally. His pulse rate, set at 60, was recorded as 69 today.    He has a mood disorder, specifically bipolar disorder, which is now managed in this clinic. He reports that gabapentin is effectively managing his mood symptoms.    He has essential hypertension, which is well-controlled. He takes Viagra when his blood pressure is significantly elevated, finding it highly effective.    He has dyslipidemia, which is well-managed.    He has COPD and reports satisfactory respiratory function, with infrequent use of his rescue inhaler.    He has a vitamin D deficiency and has run out of his vitamin D supplement.    He has an elevated PSA level but remains asymptomatic and has declined a urology consult. He reports no discomfort or unusual urinary urges.    He has prediabetes, which is well-managed.    He was referred to gastroenterology for a screening colonoscopy during his last visit but has not yet scheduled the procedure due to travel commitments. He plans to undergo the colonoscopy at a later date.    SOCIAL HISTORY  He does not drink alcohol.    MEDICATIONS  Current: gabapentin, lisinopril, amlodipine, atorvastatin, spironolactone, sildenafil, hydrochlorothiazide, albuterol    IMMUNIZATIONS  He is up to date with his tetanus vaccine. He has declined the pneumonia and influenza vaccines.    Results  Laboratory Studies  Electrolytes remain normal. Kidney numbers are normal. Blood glucose is 91. A1c is 5.5. Cholesterol

## 2025-04-23 ENCOUNTER — TELEPHONE (OUTPATIENT)
Dept: FAMILY MEDICINE CLINIC | Age: 75
End: 2025-04-23

## 2025-04-23 DIAGNOSIS — N52.9 ERECTILE DYSFUNCTION, UNSPECIFIED ERECTILE DYSFUNCTION TYPE: ICD-10-CM

## 2025-04-23 RX ORDER — SILDENAFIL 50 MG/1
50 TABLET, FILM COATED ORAL DAILY PRN
Qty: 90 TABLET | Refills: 0 | Status: SHIPPED | OUTPATIENT
Start: 2025-04-23

## 2025-04-23 NOTE — TELEPHONE ENCOUNTER
Name of Medication(s) Requested:  Requested Prescriptions     Pending Prescriptions Disp Refills    sildenafil (VIAGRA) 50 MG tablet [Pharmacy Med Name: Sildenafil Citrate Oral Tablet 50 MG] 90 tablet 0     Sig: TAKE ONE TABLET BY MOUTH DAILY AS NEEDED for erectile dysfunction       Medication is on current medication list Yes    Dosage and directions were verified? Yes    Quantity verified: 90 day supply     Pharmacy Verified?  Yes    Last Appointment:  1/7/2025    Future appts:  Future Appointments   Date Time Provider Department Center   7/1/2025  1:15 PM Byron Peace DO [facility]        (If no appt send self scheduling link. .REFILLAPPT)  Scheduling request sent?     [] Yes  [x] No    Does patient need updated?  [] Yes  [x] No